# Patient Record
Sex: MALE | Race: WHITE | Employment: OTHER | URBAN - METROPOLITAN AREA
[De-identification: names, ages, dates, MRNs, and addresses within clinical notes are randomized per-mention and may not be internally consistent; named-entity substitution may affect disease eponyms.]

---

## 2017-08-01 PROBLEM — M48.061 LUMBAR SPINAL STENOSIS: Status: ACTIVE | Noted: 2017-08-01

## 2017-08-01 PROBLEM — I10 ESSENTIAL HYPERTENSION: Status: ACTIVE | Noted: 2017-08-01

## 2017-08-01 PROBLEM — E11.69 HYPERLIPIDEMIA ASSOCIATED WITH TYPE 2 DIABETES MELLITUS (HCC): Status: ACTIVE | Noted: 2017-08-01

## 2017-08-01 PROBLEM — E78.5 HYPERLIPIDEMIA ASSOCIATED WITH TYPE 2 DIABETES MELLITUS (HCC): Status: ACTIVE | Noted: 2017-08-01

## 2017-08-01 PROBLEM — E11.29 TYPE 2 DIABETES MELLITUS WITH KIDNEY COMPLICATION, WITHOUT LONG-TERM CURRENT USE OF INSULIN (HCC): Status: ACTIVE | Noted: 2017-08-01

## 2017-08-01 PROBLEM — N40.0 BENIGN PROSTATIC HYPERPLASIA WITHOUT LOWER URINARY TRACT SYMPTOMS: Status: ACTIVE | Noted: 2017-08-01

## 2017-08-01 PROBLEM — R01.1 PANSYSTOLIC MURMUR: Status: ACTIVE | Noted: 2017-08-01

## 2018-12-03 PROBLEM — E53.8 B12 DEFICIENCY: Status: ACTIVE | Noted: 2018-01-01

## 2019-01-01 ENCOUNTER — CARE COORDINATION (OUTPATIENT)
Dept: CARE COORDINATION | Age: 84
End: 2019-01-01

## 2019-01-01 ENCOUNTER — APPOINTMENT (OUTPATIENT)
Dept: GENERAL RADIOLOGY | Age: 84
DRG: 812 | End: 2019-01-01
Attending: SURGERY
Payer: MEDICARE

## 2019-01-01 ENCOUNTER — ANESTHESIA (OUTPATIENT)
Dept: ENDOSCOPY | Age: 84
DRG: 812 | End: 2019-01-01
Payer: MEDICARE

## 2019-01-01 ENCOUNTER — HOSPITAL ENCOUNTER (OUTPATIENT)
Dept: NUCLEAR MEDICINE | Age: 84
Discharge: HOME | End: 2019-03-23
Payer: MEDICARE

## 2019-01-01 ENCOUNTER — HOSPITAL ENCOUNTER (EMERGENCY)
Age: 84
Discharge: HOME | End: 2019-04-15
Attending: EMERGENCY MEDICINE
Payer: MEDICARE

## 2019-01-01 ENCOUNTER — APPOINTMENT (OUTPATIENT)
Dept: GENERAL RADIOLOGY | Age: 84
DRG: 558 | End: 2019-01-01
Payer: MEDICARE

## 2019-01-01 ENCOUNTER — HOSPITAL ENCOUNTER (INPATIENT)
Age: 84
LOS: 1 days | Discharge: OTHER INSTITUTION | DRG: 558 | End: 2019-04-19
Attending: EMERGENCY MEDICINE | Admitting: INTERNAL MEDICINE
Payer: MEDICARE

## 2019-01-01 ENCOUNTER — HOSPITAL ENCOUNTER (OUTPATIENT)
Age: 84
Discharge: HOME | End: 2019-06-06
Payer: MEDICARE

## 2019-01-01 ENCOUNTER — ANESTHESIA EVENT (OUTPATIENT)
Dept: ENDOSCOPY | Age: 84
DRG: 812 | End: 2019-01-01
Payer: MEDICARE

## 2019-01-01 ENCOUNTER — TELEPHONE (OUTPATIENT)
Dept: ADMINISTRATIVE | Age: 84
End: 2019-01-01

## 2019-01-01 ENCOUNTER — HOSPITAL ENCOUNTER (OUTPATIENT)
Age: 84
Discharge: HOME | End: 2019-04-12
Payer: MEDICARE

## 2019-01-01 ENCOUNTER — TELEPHONE (OUTPATIENT)
Dept: PHARMACY | Facility: CLINIC | Age: 84
End: 2019-01-01

## 2019-01-01 ENCOUNTER — OFFICE VISIT (OUTPATIENT)
Dept: CARDIOLOGY CLINIC | Age: 84
End: 2019-01-01
Payer: MEDICARE

## 2019-01-01 ENCOUNTER — APPOINTMENT (OUTPATIENT)
Dept: CT IMAGING | Age: 84
DRG: 558 | End: 2019-01-01
Payer: MEDICARE

## 2019-01-01 ENCOUNTER — HOSPITAL ENCOUNTER (INPATIENT)
Age: 84
LOS: 3 days | Discharge: HOME | DRG: 812 | End: 2019-04-02
Attending: SURGERY | Admitting: SURGERY
Payer: MEDICARE

## 2019-01-01 ENCOUNTER — APPOINTMENT (OUTPATIENT)
Dept: CT IMAGING | Age: 84
End: 2019-01-01
Payer: MEDICARE

## 2019-01-01 VITALS
HEART RATE: 60 BPM | RESPIRATION RATE: 60 BRPM | SYSTOLIC BLOOD PRESSURE: 122 MMHG | BODY MASS INDEX: 26.02 KG/M2 | DIASTOLIC BLOOD PRESSURE: 60 MMHG | HEIGHT: 67 IN | WEIGHT: 165.8 LBS

## 2019-01-01 VITALS
TEMPERATURE: 97.9 F | HEIGHT: 67 IN | OXYGEN SATURATION: 96 % | SYSTOLIC BLOOD PRESSURE: 121 MMHG | BODY MASS INDEX: 24.8 KG/M2 | HEART RATE: 86 BPM | RESPIRATION RATE: 17 BRPM | DIASTOLIC BLOOD PRESSURE: 56 MMHG | WEIGHT: 158 LBS

## 2019-01-01 VITALS
RESPIRATION RATE: 16 BRPM | SYSTOLIC BLOOD PRESSURE: 118 MMHG | HEART RATE: 96 BPM | HEIGHT: 67 IN | TEMPERATURE: 98.8 F | WEIGHT: 158 LBS | DIASTOLIC BLOOD PRESSURE: 54 MMHG | OXYGEN SATURATION: 98 % | BODY MASS INDEX: 24.8 KG/M2

## 2019-01-01 VITALS
HEART RATE: 60 BPM | RESPIRATION RATE: 15 BRPM | TEMPERATURE: 98.3 F | HEIGHT: 67 IN | BODY MASS INDEX: 24.96 KG/M2 | OXYGEN SATURATION: 98 % | DIASTOLIC BLOOD PRESSURE: 63 MMHG | SYSTOLIC BLOOD PRESSURE: 124 MMHG | WEIGHT: 159 LBS

## 2019-01-01 VITALS
RESPIRATION RATE: 48 BRPM | OXYGEN SATURATION: 97 % | DIASTOLIC BLOOD PRESSURE: 48 MMHG | SYSTOLIC BLOOD PRESSURE: 98 MMHG

## 2019-01-01 DIAGNOSIS — R53.83 FATIGUE, UNSPECIFIED TYPE: ICD-10-CM

## 2019-01-01 DIAGNOSIS — R11.0 NAUSEA: ICD-10-CM

## 2019-01-01 DIAGNOSIS — G89.29 CHRONIC MIDLINE LOW BACK PAIN WITH BILATERAL SCIATICA: ICD-10-CM

## 2019-01-01 DIAGNOSIS — M54.42 CHRONIC MIDLINE LOW BACK PAIN WITH BILATERAL SCIATICA: ICD-10-CM

## 2019-01-01 DIAGNOSIS — R06.02 SOBOE (SHORTNESS OF BREATH ON EXERTION): ICD-10-CM

## 2019-01-01 DIAGNOSIS — Z79.899 ENCOUNTER FOR MEDICATION REVIEW: Primary | ICD-10-CM

## 2019-01-01 DIAGNOSIS — M54.41 CHRONIC MIDLINE LOW BACK PAIN WITH BILATERAL SCIATICA: ICD-10-CM
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
## 2019-01-01 DIAGNOSIS — R12 HEART BURN: ICD-10-CM

## 2019-01-01 DIAGNOSIS — R63.0 POOR APPETITE: ICD-10-CM

## 2019-01-01 DIAGNOSIS — I10 ESSENTIAL HYPERTENSION: ICD-10-CM

## 2019-01-01 DIAGNOSIS — D63.8 ANEMIA OF CHRONIC DISEASE: ICD-10-CM

## 2019-01-01 DIAGNOSIS — E78.5 DYSLIPIDEMIA: ICD-10-CM

## 2019-01-01 DIAGNOSIS — N18.4 STAGE 4 CHRONIC KIDNEY DISEASE (HCC): ICD-10-CM

## 2019-01-01 DIAGNOSIS — N18.4 CKD (CHRONIC KIDNEY DISEASE) STAGE 4, GFR 15-29 ML/MIN (HCC): ICD-10-CM

## 2019-01-01 DIAGNOSIS — R11.2 NON-INTRACTABLE VOMITING WITH NAUSEA, UNSPECIFIED VOMITING TYPE: Primary | ICD-10-CM

## 2019-01-01 DIAGNOSIS — R01.1 HEART MURMUR: ICD-10-CM

## 2019-01-01 DIAGNOSIS — Z86.39 H/O NON ANEMIC VITAMIN B12 DEFICIENCY: ICD-10-CM

## 2019-01-01 DIAGNOSIS — N40.0 BENIGN PROSTATIC HYPERPLASIA WITHOUT LOWER URINARY TRACT SYMPTOMS: ICD-10-CM

## 2019-01-01 DIAGNOSIS — R06.02 SOBOE (SHORTNESS OF BREATH ON EXERTION): Primary | ICD-10-CM

## 2019-01-01 DIAGNOSIS — M48.061 SPINAL STENOSIS OF LUMBAR REGION, UNSPECIFIED WHETHER NEUROGENIC CLAUDICATION PRESENT: ICD-10-CM

## 2019-01-01 LAB
ALBUMIN SERPL-MCNC: 2.7 G/DL (ref 3.5–5.2)
ALBUMIN SERPL-MCNC: 2.8 G/DL (ref 3.5–5.2)
ALBUMIN SERPL-MCNC: 2.8 G/DL (ref 3.5–5.2)
ALBUMIN SERPL-MCNC: 2.9 G/DL (ref 3.5–5.2)
ALBUMIN SERPL-MCNC: 2.9 G/DL (ref 3.5–5.2)
ALBUMIN SERPL-MCNC: 3 G/DL (ref 3.5–5.2)
ALBUMIN SERPL-MCNC: 3.2 G/DL (ref 3.5–5.2)
ALBUMIN SERPL-MCNC: 3.3 G/DL (ref 3.5–5.2)
ALP BLD-CCNC: 49 U/L (ref 40–129)
ALP BLD-CCNC: 59 U/L (ref 40–129)
ALP BLD-CCNC: 61 U/L (ref 40–129)
ALP BLD-CCNC: 63 U/L (ref 40–129)
ALP BLD-CCNC: 63 U/L (ref 40–129)
ALP BLD-CCNC: 69 U/L (ref 40–129)
ALT SERPL-CCNC: 11 U/L (ref 0–40)
ALT SERPL-CCNC: 6 U/L (ref 0–40)
ALT SERPL-CCNC: 7 U/L (ref 0–40)
ANION GAP SERPL CALCULATED.3IONS-SCNC: 10 MMOL/L (ref 7–16)
ANION GAP SERPL CALCULATED.3IONS-SCNC: 11 MMOL/L (ref 7–16)
ANION GAP SERPL CALCULATED.3IONS-SCNC: 11 MMOL/L (ref 7–16)
ANION GAP SERPL CALCULATED.3IONS-SCNC: 13 MMOL/L (ref 7–16)
ANION GAP SERPL CALCULATED.3IONS-SCNC: 13 MMOL/L (ref 7–16)
ANION GAP SERPL CALCULATED.3IONS-SCNC: 14 MMOL/L (ref 7–16)
ANION GAP SERPL CALCULATED.3IONS-SCNC: 14 MMOL/L (ref 7–16)
ANION GAP SERPL CALCULATED.3IONS-SCNC: 16 MMOL/L (ref 7–16)
ANION GAP SERPL CALCULATED.3IONS-SCNC: 8 MMOL/L (ref 7–16)
AST SERPL-CCNC: 11 U/L (ref 0–39)
AST SERPL-CCNC: 12 U/L (ref 0–39)
AST SERPL-CCNC: 15 U/L (ref 0–39)
AST SERPL-CCNC: 18 U/L (ref 0–39)
AST SERPL-CCNC: 18 U/L (ref 0–39)
AST SERPL-CCNC: 31 U/L (ref 0–39)
BACTERIA: ABNORMAL /HPF
BACTERIA: ABNORMAL /HPF
BASOPHILS ABSOLUTE: 0.02 E9/L (ref 0–0.2)
BASOPHILS ABSOLUTE: 0.03 E9/L (ref 0–0.2)
BASOPHILS ABSOLUTE: 0.03 E9/L (ref 0–0.2)
BASOPHILS ABSOLUTE: 0.04 E9/L (ref 0–0.2)
BASOPHILS ABSOLUTE: 0.04 E9/L (ref 0–0.2)
BASOPHILS RELATIVE PERCENT: 0.2 % (ref 0–2)
BASOPHILS RELATIVE PERCENT: 0.2 % (ref 0–2)
BASOPHILS RELATIVE PERCENT: 0.3 % (ref 0–2)
BASOPHILS RELATIVE PERCENT: 0.4 % (ref 0–2)
BASOPHILS RELATIVE PERCENT: 0.5 % (ref 0–2)
BASOPHILS RELATIVE PERCENT: 0.6 % (ref 0–2)
BILIRUB SERPL-MCNC: 0.2 MG/DL (ref 0–1.2)
BILIRUB SERPL-MCNC: 0.2 MG/DL (ref 0–1.2)
BILIRUB SERPL-MCNC: 0.3 MG/DL (ref 0–1.2)
BILIRUB SERPL-MCNC: 0.3 MG/DL (ref 0–1.2)
BILIRUB SERPL-MCNC: <0.2 MG/DL (ref 0–1.2)
BILIRUBIN URINE: ABNORMAL
BILIRUBIN URINE: NEGATIVE
BLOOD, URINE: ABNORMAL
BLOOD, URINE: ABNORMAL
BUN BLDV-MCNC: 19 MG/DL (ref 8–23)
BUN BLDV-MCNC: 23 MG/DL (ref 8–23)
BUN BLDV-MCNC: 25 MG/DL (ref 8–23)
BUN BLDV-MCNC: 26 MG/DL (ref 8–23)
BUN BLDV-MCNC: 31 MG/DL (ref 8–23)
BUN BLDV-MCNC: 32 MG/DL (ref 8–23)
BUN BLDV-MCNC: 40 MG/DL (ref 8–23)
BUN BLDV-MCNC: 42 MG/DL (ref 8–23)
BUN BLDV-MCNC: 43 MG/DL (ref 8–23)
CALCIUM SERPL-MCNC: 7.5 MG/DL (ref 8.6–10.2)
CALCIUM SERPL-MCNC: 7.9 MG/DL (ref 8.6–10.2)
CALCIUM SERPL-MCNC: 7.9 MG/DL (ref 8.6–10.2)
CALCIUM SERPL-MCNC: 8.5 MG/DL (ref 8.6–10.2)
CALCIUM SERPL-MCNC: 8.6 MG/DL (ref 8.6–10.2)
CALCIUM SERPL-MCNC: 8.6 MG/DL (ref 8.6–10.2)
CALCIUM SERPL-MCNC: 8.7 MG/DL (ref 8.6–10.2)
CALCIUM SERPL-MCNC: 8.7 MG/DL (ref 8.6–10.2)
CALCIUM SERPL-MCNC: 9.1 MG/DL (ref 8.6–10.2)
CASTS: ABNORMAL /LPF
CASTS: ABNORMAL /LPF
CHLORIDE BLD-SCNC: 101 MMOL/L (ref 98–107)
CHLORIDE BLD-SCNC: 102 MMOL/L (ref 98–107)
CHLORIDE BLD-SCNC: 102 MMOL/L (ref 98–107)
CHLORIDE BLD-SCNC: 104 MMOL/L (ref 98–107)
CHLORIDE BLD-SCNC: 104 MMOL/L (ref 98–107)
CHLORIDE BLD-SCNC: 105 MMOL/L (ref 98–107)
CHLORIDE BLD-SCNC: 106 MMOL/L (ref 98–107)
CHLORIDE BLD-SCNC: 106 MMOL/L (ref 98–107)
CHLORIDE BLD-SCNC: 108 MMOL/L (ref 98–107)
CLARITY: CLEAR
CLARITY: CLEAR
CO2: 23 MMOL/L (ref 22–29)
CO2: 24 MMOL/L (ref 22–29)
CO2: 24 MMOL/L (ref 22–29)
CO2: 25 MMOL/L (ref 22–29)
CO2: 26 MMOL/L (ref 22–29)
CO2: 27 MMOL/L (ref 22–29)
COLOR: YELLOW
COLOR: YELLOW
CREAT SERPL-MCNC: 1.8 MG/DL (ref 0.7–1.2)
CREAT SERPL-MCNC: 2 MG/DL (ref 0.7–1.2)
CREAT SERPL-MCNC: 2.3 MG/DL (ref 0.7–1.2)
CREAT SERPL-MCNC: 2.4 MG/DL (ref 0.7–1.2)
CREAT SERPL-MCNC: 2.5 MG/DL (ref 0.7–1.2)
CREAT SERPL-MCNC: 2.7 MG/DL (ref 0.7–1.2)
CREAT SERPL-MCNC: 2.7 MG/DL (ref 0.7–1.2)
EKG ATRIAL RATE: 71 BPM
EKG ATRIAL RATE: 85 BPM
EKG P AXIS: 49 DEGREES
EKG P-R INTERVAL: 188 MS
EKG P-R INTERVAL: 316 MS
EKG Q-T INTERVAL: 450 MS
EKG Q-T INTERVAL: 482 MS
EKG QRS DURATION: 142 MS
EKG QRS DURATION: 144 MS
EKG QTC CALCULATION (BAZETT): 489 MS
EKG QTC CALCULATION (BAZETT): 538 MS
EKG R AXIS: -31 DEGREES
EKG R AXIS: -33 DEGREES
EKG T AXIS: 31 DEGREES
EKG T AXIS: 74 DEGREES
EKG VENTRICULAR RATE: 71 BPM
EKG VENTRICULAR RATE: 75 BPM
EOSINOPHILS ABSOLUTE: 0.16 E9/L (ref 0.05–0.5)
EOSINOPHILS ABSOLUTE: 0.16 E9/L (ref 0.05–0.5)
EOSINOPHILS ABSOLUTE: 0.19 E9/L (ref 0.05–0.5)
EOSINOPHILS ABSOLUTE: 0.41 E9/L (ref 0.05–0.5)
EOSINOPHILS ABSOLUTE: 0.57 E9/L (ref 0.05–0.5)
EOSINOPHILS ABSOLUTE: 0.64 E9/L (ref 0.05–0.5)
EOSINOPHILS ABSOLUTE: 0.65 E9/L (ref 0.05–0.5)
EOSINOPHILS ABSOLUTE: 0.68 E9/L (ref 0.05–0.5)
EOSINOPHILS RELATIVE PERCENT: 1.8 % (ref 0–6)
EOSINOPHILS RELATIVE PERCENT: 2.2 % (ref 0–6)
EOSINOPHILS RELATIVE PERCENT: 2.3 % (ref 0–6)
EOSINOPHILS RELATIVE PERCENT: 4.7 % (ref 0–6)
EOSINOPHILS RELATIVE PERCENT: 7 % (ref 0–6)
EOSINOPHILS RELATIVE PERCENT: 8.4 % (ref 0–6)
EOSINOPHILS RELATIVE PERCENT: 8.8 % (ref 0–6)
EOSINOPHILS RELATIVE PERCENT: 8.8 % (ref 0–6)
EPITHELIAL CELLS, UA: ABNORMAL /HPF
GFR AFRICAN AMERICAN: 27
GFR AFRICAN AMERICAN: 27
GFR AFRICAN AMERICAN: 29
GFR AFRICAN AMERICAN: 31
GFR AFRICAN AMERICAN: 32
GFR AFRICAN AMERICAN: 34
GFR AFRICAN AMERICAN: 38
GFR AFRICAN AMERICAN: 43
GFR NON-AFRICAN AMERICAN: 22 ML/MIN/1.73
GFR NON-AFRICAN AMERICAN: 22 ML/MIN/1.73
GFR NON-AFRICAN AMERICAN: 24 ML/MIN/1.73
GFR NON-AFRICAN AMERICAN: 25 ML/MIN/1.73
GFR NON-AFRICAN AMERICAN: 27 ML/MIN/1.73
GFR NON-AFRICAN AMERICAN: 28 ML/MIN/1.73
GFR NON-AFRICAN AMERICAN: 31 ML/MIN/1.73
GFR NON-AFRICAN AMERICAN: 35 ML/MIN/1.73
GLUCOSE BLD-MCNC: 100 MG/DL (ref 74–99)
GLUCOSE BLD-MCNC: 100 MG/DL (ref 74–99)
GLUCOSE BLD-MCNC: 102 MG/DL (ref 74–99)
GLUCOSE BLD-MCNC: 102 MG/DL (ref 74–99)
GLUCOSE BLD-MCNC: 111 MG/DL (ref 74–99)
GLUCOSE BLD-MCNC: 121 MG/DL (ref 74–99)
GLUCOSE BLD-MCNC: 74 MG/DL (ref 74–99)
GLUCOSE BLD-MCNC: 82 MG/DL (ref 74–99)
GLUCOSE BLD-MCNC: 83 MG/DL (ref 74–99)
GLUCOSE BLD-MCNC: 99 MG/DL (ref 74–99)
GLUCOSE URINE: NEGATIVE MG/DL
GLUCOSE URINE: NEGATIVE MG/DL
HBA1C MFR BLD: 5.9 % (ref 4–5.6)
HCT VFR BLD CALC: 21.6 % (ref 37–54)
HCT VFR BLD CALC: 22.6 % (ref 37–54)
HCT VFR BLD CALC: 22.9 % (ref 37–54)
HCT VFR BLD CALC: 22.9 % (ref 37–54)
HCT VFR BLD CALC: 26 % (ref 37–54)
HCT VFR BLD CALC: 26 % (ref 37–54)
HCT VFR BLD CALC: 26.5 % (ref 37–54)
HCT VFR BLD CALC: 27.1 % (ref 37–54)
HEMOGLOBIN: 7.2 G/DL (ref 12.5–16.5)
HEMOGLOBIN: 7.4 G/DL (ref 12.5–16.5)
HEMOGLOBIN: 7.5 G/DL (ref 12.5–16.5)
HEMOGLOBIN: 7.6 G/DL (ref 12.5–16.5)
HEMOGLOBIN: 8.3 G/DL (ref 12.5–16.5)
HEMOGLOBIN: 8.3 G/DL (ref 12.5–16.5)
HEMOGLOBIN: 8.5 G/DL (ref 12.5–16.5)
HEMOGLOBIN: 8.6 G/DL (ref 12.5–16.5)
IMMATURE GRANULOCYTES #: 0.02 E9/L
IMMATURE GRANULOCYTES #: 0.03 E9/L
IMMATURE GRANULOCYTES #: 0.03 E9/L
IMMATURE GRANULOCYTES #: 0.04 E9/L
IMMATURE GRANULOCYTES #: 0.05 E9/L
IMMATURE GRANULOCYTES #: 0.06 E9/L
IMMATURE GRANULOCYTES %: 0.3 % (ref 0–5)
IMMATURE GRANULOCYTES %: 0.4 % (ref 0–5)
IMMATURE GRANULOCYTES %: 0.4 % (ref 0–5)
IMMATURE GRANULOCYTES %: 0.5 % (ref 0–5)
IMMATURE GRANULOCYTES %: 0.7 % (ref 0–5)
INR BLD: 1.1
KETONES, URINE: 15 MG/DL
KETONES, URINE: 40 MG/DL
LACTIC ACID, SEPSIS: 1.3 MMOL/L (ref 0.5–1.9)
LEUKOCYTE ESTERASE, URINE: ABNORMAL
LEUKOCYTE ESTERASE, URINE: NEGATIVE
LIPASE: 77 U/L (ref 13–60)
LYMPHOCYTES ABSOLUTE: 1.74 E9/L (ref 1.5–4)
LYMPHOCYTES ABSOLUTE: 1.75 E9/L (ref 1.5–4)
LYMPHOCYTES ABSOLUTE: 1.82 E9/L (ref 1.5–4)
LYMPHOCYTES ABSOLUTE: 1.84 E9/L (ref 1.5–4)
LYMPHOCYTES ABSOLUTE: 2.02 E9/L (ref 1.5–4)
LYMPHOCYTES ABSOLUTE: 2.06 E9/L (ref 1.5–4)
LYMPHOCYTES ABSOLUTE: 2.13 E9/L (ref 1.5–4)
LYMPHOCYTES ABSOLUTE: 2.31 E9/L (ref 1.5–4)
LYMPHOCYTES RELATIVE PERCENT: 19 % (ref 20–42)
LYMPHOCYTES RELATIVE PERCENT: 20.7 % (ref 20–42)
LYMPHOCYTES RELATIVE PERCENT: 22.5 % (ref 20–42)
LYMPHOCYTES RELATIVE PERCENT: 24.5 % (ref 20–42)
LYMPHOCYTES RELATIVE PERCENT: 25.3 % (ref 20–42)
LYMPHOCYTES RELATIVE PERCENT: 26.4 % (ref 20–42)
LYMPHOCYTES RELATIVE PERCENT: 28 % (ref 20–42)
LYMPHOCYTES RELATIVE PERCENT: 29.7 % (ref 20–42)
MAGNESIUM: 2.1 MG/DL (ref 1.6–2.6)
MCH RBC QN AUTO: 31.2 PG (ref 26–35)
MCH RBC QN AUTO: 31.2 PG (ref 26–35)
MCH RBC QN AUTO: 31.3 PG (ref 26–35)
MCH RBC QN AUTO: 31.5 PG (ref 26–35)
MCH RBC QN AUTO: 31.8 PG (ref 26–35)
MCH RBC QN AUTO: 31.9 PG (ref 26–35)
MCH RBC QN AUTO: 31.9 PG (ref 26–35)
MCH RBC QN AUTO: 32.1 PG (ref 26–35)
MCHC RBC AUTO-ENTMCNC: 31.3 % (ref 32–34.5)
MCHC RBC AUTO-ENTMCNC: 31.7 % (ref 32–34.5)
MCHC RBC AUTO-ENTMCNC: 31.9 % (ref 32–34.5)
MCHC RBC AUTO-ENTMCNC: 32.7 % (ref 32–34.5)
MCHC RBC AUTO-ENTMCNC: 32.7 % (ref 32–34.5)
MCHC RBC AUTO-ENTMCNC: 32.8 % (ref 32–34.5)
MCHC RBC AUTO-ENTMCNC: 33.2 % (ref 32–34.5)
MCHC RBC AUTO-ENTMCNC: 33.3 % (ref 32–34.5)
MCV RBC AUTO: 100 FL (ref 80–99.9)
MCV RBC AUTO: 96.2 FL (ref 80–99.9)
MCV RBC AUTO: 96.3 FL (ref 80–99.9)
MCV RBC AUTO: 96.4 FL (ref 80–99.9)
MCV RBC AUTO: 97 FL (ref 80–99.9)
MCV RBC AUTO: 97.4 FL (ref 80–99.9)
MCV RBC AUTO: 97.7 FL (ref 80–99.9)
MCV RBC AUTO: 98.2 FL (ref 80–99.9)
METER GLUCOSE: 101 MG/DL (ref 74–99)
METER GLUCOSE: 105 MG/DL (ref 74–99)
METER GLUCOSE: 107 MG/DL (ref 74–99)
METER GLUCOSE: 107 MG/DL (ref 74–99)
METER GLUCOSE: 113 MG/DL (ref 74–99)
METER GLUCOSE: 120 MG/DL (ref 74–99)
METER GLUCOSE: 134 MG/DL (ref 74–99)
METER GLUCOSE: 137 MG/DL (ref 74–99)
METER GLUCOSE: 162 MG/DL (ref 74–99)
METER GLUCOSE: 72 MG/DL (ref 74–99)
METER GLUCOSE: 98 MG/DL (ref 74–99)
MONOCYTES ABSOLUTE: 0.79 E9/L (ref 0.1–0.95)
MONOCYTES ABSOLUTE: 0.8 E9/L (ref 0.1–0.95)
MONOCYTES ABSOLUTE: 0.8 E9/L (ref 0.1–0.95)
MONOCYTES ABSOLUTE: 0.81 E9/L (ref 0.1–0.95)
MONOCYTES ABSOLUTE: 0.82 E9/L (ref 0.1–0.95)
MONOCYTES ABSOLUTE: 0.82 E9/L (ref 0.1–0.95)
MONOCYTES ABSOLUTE: 0.91 E9/L (ref 0.1–0.95)
MONOCYTES ABSOLUTE: 0.93 E9/L (ref 0.1–0.95)
MONOCYTES RELATIVE PERCENT: 10.2 % (ref 2–12)
MONOCYTES RELATIVE PERCENT: 10.3 % (ref 2–12)
MONOCYTES RELATIVE PERCENT: 10.5 % (ref 2–12)
MONOCYTES RELATIVE PERCENT: 10.7 % (ref 2–12)
MONOCYTES RELATIVE PERCENT: 11 % (ref 2–12)
MONOCYTES RELATIVE PERCENT: 11.6 % (ref 2–12)
MONOCYTES RELATIVE PERCENT: 9.3 % (ref 2–12)
MONOCYTES RELATIVE PERCENT: 9.6 % (ref 2–12)
NEUTROPHILS ABSOLUTE: 3.79 E9/L (ref 1.8–7.3)
NEUTROPHILS ABSOLUTE: 3.93 E9/L (ref 1.8–7.3)
NEUTROPHILS ABSOLUTE: 4.1 E9/L (ref 1.8–7.3)
NEUTROPHILS ABSOLUTE: 4.14 E9/L (ref 1.8–7.3)
NEUTROPHILS ABSOLUTE: 4.92 E9/L (ref 1.8–7.3)
NEUTROPHILS ABSOLUTE: 5.38 E9/L (ref 1.8–7.3)
NEUTROPHILS ABSOLUTE: 5.67 E9/L (ref 1.8–7.3)
NEUTROPHILS ABSOLUTE: 6.24 E9/L (ref 1.8–7.3)
NEUTROPHILS RELATIVE PERCENT: 50.5 % (ref 43–80)
NEUTROPHILS RELATIVE PERCENT: 51.4 % (ref 43–80)
NEUTROPHILS RELATIVE PERCENT: 53.6 % (ref 43–80)
NEUTROPHILS RELATIVE PERCENT: 59.9 % (ref 43–80)
NEUTROPHILS RELATIVE PERCENT: 60 % (ref 43–80)
NEUTROPHILS RELATIVE PERCENT: 61.9 % (ref 43–80)
NEUTROPHILS RELATIVE PERCENT: 64.5 % (ref 43–80)
NEUTROPHILS RELATIVE PERCENT: 68.3 % (ref 43–80)
NITRITE, URINE: NEGATIVE
NITRITE, URINE: NEGATIVE
PDW BLD-RTO: 13.9 FL (ref 11.5–15)
PDW BLD-RTO: 14.1 FL (ref 11.5–15)
PDW BLD-RTO: 14.3 FL (ref 11.5–15)
PDW BLD-RTO: 14.3 FL (ref 11.5–15)
PDW BLD-RTO: 14.4 FL (ref 11.5–15)
PDW BLD-RTO: 14.4 FL (ref 11.5–15)
PDW BLD-RTO: 14.6 FL (ref 11.5–15)
PDW BLD-RTO: 17.4 FL (ref 11.5–15)
PERFORMED ON: ABNORMAL
PH UA: 5.5 (ref 5–9)
PH UA: 5.5 (ref 5–9)
PLATELET # BLD: 184 E9/L (ref 130–450)
PLATELET # BLD: 185 E9/L (ref 130–450)
PLATELET # BLD: 201 E9/L (ref 130–450)
PLATELET # BLD: 205 E9/L (ref 130–450)
PLATELET # BLD: 207 E9/L (ref 130–450)
PLATELET # BLD: 235 E9/L (ref 130–450)
PLATELET # BLD: 237 E9/L (ref 130–450)
PLATELET # BLD: 243 E9/L (ref 130–450)
PMV BLD AUTO: 10 FL (ref 7–12)
PMV BLD AUTO: 10.1 FL (ref 7–12)
PMV BLD AUTO: 10.2 FL (ref 7–12)
PMV BLD AUTO: 10.3 FL (ref 7–12)
PMV BLD AUTO: 11.8 FL (ref 7–12)
PMV BLD AUTO: 9.7 FL (ref 7–12)
PMV BLD AUTO: 9.8 FL (ref 7–12)
PMV BLD AUTO: 9.8 FL (ref 7–12)
POC CHLORIDE: 111 MMOL/L (ref 100–108)
POC CREATININE: 2.2 MG/DL (ref 0.7–1.2)
POC POTASSIUM: 4.1 MMOL/L (ref 3.5–5)
POC SODIUM: 142 MMOL/L (ref 132–146)
POTASSIUM REFLEX MAGNESIUM: 3.7 MMOL/L (ref 3.5–5)
POTASSIUM REFLEX MAGNESIUM: 4.2 MMOL/L (ref 3.5–5)
POTASSIUM REFLEX MAGNESIUM: 4.2 MMOL/L (ref 3.5–5)
POTASSIUM SERPL-SCNC: 2.9 MMOL/L (ref 3.5–5)
POTASSIUM SERPL-SCNC: 3.8 MMOL/L (ref 3.5–5)
POTASSIUM SERPL-SCNC: 4 MMOL/L (ref 3.5–5)
POTASSIUM SERPL-SCNC: 4.1 MMOL/L (ref 3.5–5)
POTASSIUM SERPL-SCNC: 4.2 MMOL/L (ref 3.5–5)
PROTEIN UA: ABNORMAL MG/DL
PROTEIN UA: NEGATIVE MG/DL
PROTHROMBIN TIME: 12.4 SEC (ref 9.3–12.4)
RBC # BLD: 2.24 E12/L (ref 3.8–5.8)
RBC # BLD: 2.33 E12/L (ref 3.8–5.8)
RBC # BLD: 2.35 E12/L (ref 3.8–5.8)
RBC # BLD: 2.38 E12/L (ref 3.8–5.8)
RBC # BLD: 2.65 E12/L (ref 3.8–5.8)
RBC # BLD: 2.66 E12/L (ref 3.8–5.8)
RBC # BLD: 2.7 E12/L (ref 3.8–5.8)
RBC # BLD: 2.76 E12/L (ref 3.8–5.8)
RBC UA: ABNORMAL /HPF (ref 0–2)
RBC UA: ABNORMAL /HPF (ref 0–2)
RENAL EPITHELIAL, UA: ABNORMAL /HPF
SODIUM BLD-SCNC: 138 MMOL/L (ref 132–146)
SODIUM BLD-SCNC: 140 MMOL/L (ref 132–146)
SODIUM BLD-SCNC: 141 MMOL/L (ref 132–146)
SODIUM BLD-SCNC: 142 MMOL/L (ref 132–146)
SODIUM BLD-SCNC: 143 MMOL/L (ref 132–146)
SPECIFIC GRAVITY UA: >=1.03 (ref 1–1.03)
SPECIFIC GRAVITY UA: >=1.03 (ref 1–1.03)
TOTAL CK: 367 U/L (ref 20–200)
TOTAL CK: 474 U/L (ref 20–200)
TOTAL PROTEIN: 5.5 G/DL (ref 6.4–8.3)
TOTAL PROTEIN: 5.6 G/DL (ref 6.4–8.3)
TOTAL PROTEIN: 5.9 G/DL (ref 6.4–8.3)
TOTAL PROTEIN: 6 G/DL (ref 6.4–8.3)
TOTAL PROTEIN: 6 G/DL (ref 6.4–8.3)
TOTAL PROTEIN: 6.3 G/DL (ref 6.4–8.3)
TOTAL PROTEIN: 6.6 G/DL (ref 6.4–8.3)
TOTAL PROTEIN: 6.8 G/DL (ref 6.4–8.3)
TROPONIN: 0.03 NG/ML (ref 0–0.03)
URINE CULTURE, ROUTINE: NORMAL
UROBILINOGEN, URINE: 0.2 E.U./DL
UROBILINOGEN, URINE: 0.2 E.U./DL
WBC # BLD: 6.9 E9/L (ref 4.5–11.5)
WBC # BLD: 7.4 E9/L (ref 4.5–11.5)
WBC # BLD: 7.7 E9/L (ref 4.5–11.5)
WBC # BLD: 7.8 E9/L (ref 4.5–11.5)
WBC # BLD: 8.2 E9/L (ref 4.5–11.5)
WBC # BLD: 8.7 E9/L (ref 4.5–11.5)
WBC # BLD: 8.8 E9/L (ref 4.5–11.5)
WBC # BLD: 9.1 E9/L (ref 4.5–11.5)
WBC UA: ABNORMAL /HPF (ref 0–5)
WBC UA: ABNORMAL /HPF (ref 0–5)

## 2019-01-01 PROCEDURE — 2580000003 HC RX 258: Performed by: EMERGENCY MEDICINE

## 2019-01-01 PROCEDURE — 87088 URINE BACTERIA CULTURE: CPT

## 2019-01-01 PROCEDURE — 74176 CT ABD & PELVIS W/O CONTRAST: CPT

## 2019-01-01 PROCEDURE — G0378 HOSPITAL OBSERVATION PER HR: HCPCS

## 2019-01-01 PROCEDURE — 72125 CT NECK SPINE W/O DYE: CPT

## 2019-01-01 PROCEDURE — 2500000003 HC RX 250 WO HCPCS: Performed by: STUDENT IN AN ORGANIZED HEALTH CARE EDUCATION/TRAINING PROGRAM

## 2019-01-01 PROCEDURE — 93005 ELECTROCARDIOGRAM TRACING: CPT

## 2019-01-01 PROCEDURE — 2709999900 HC NON-CHARGEABLE SUPPLY: Performed by: SURGERY

## 2019-01-01 PROCEDURE — 93000 ELECTROCARDIOGRAM COMPLETE: CPT | Performed by: INTERNAL MEDICINE

## 2019-01-01 PROCEDURE — A9540 TC99M MAA: HCPCS | Performed by: RADIOLOGY

## 2019-01-01 PROCEDURE — 83735 ASSAY OF MAGNESIUM: CPT

## 2019-01-01 PROCEDURE — 97530 THERAPEUTIC ACTIVITIES: CPT

## 2019-01-01 PROCEDURE — 71045 X-RAY EXAM CHEST 1 VIEW: CPT

## 2019-01-01 PROCEDURE — 3700000000 HC ANESTHESIA ATTENDED CARE: Performed by: SURGERY

## 2019-01-01 PROCEDURE — 80053 COMPREHEN METABOLIC PANEL: CPT

## 2019-01-01 PROCEDURE — 78582 LUNG VENTILAT&PERFUS IMAGING: CPT

## 2019-01-01 PROCEDURE — 99285 EMERGENCY DEPT VISIT HI MDM: CPT

## 2019-01-01 PROCEDURE — 82962 GLUCOSE BLOOD TEST: CPT

## 2019-01-01 PROCEDURE — 2580000003 HC RX 258: Performed by: INTERNAL MEDICINE

## 2019-01-01 PROCEDURE — 2500000003 HC RX 250 WO HCPCS

## 2019-01-01 PROCEDURE — 82947 ASSAY GLUCOSE BLOOD QUANT: CPT

## 2019-01-01 PROCEDURE — 6370000000 HC RX 637 (ALT 250 FOR IP): Performed by: INTERNAL MEDICINE

## 2019-01-01 PROCEDURE — 81001 URINALYSIS AUTO W/SCOPE: CPT

## 2019-01-01 PROCEDURE — 85025 COMPLETE CBC W/AUTO DIFF WBC: CPT

## 2019-01-01 PROCEDURE — 36415 COLL VENOUS BLD VENIPUNCTURE: CPT

## 2019-01-01 PROCEDURE — 0DJD8ZZ INSPECTION OF LOWER INTESTINAL TRACT, VIA NATURAL OR ARTIFICIAL OPENING ENDOSCOPIC: ICD-10-PCS | Performed by: STUDENT IN AN ORGANIZED HEALTH CARE EDUCATION/TRAINING PROGRAM

## 2019-01-01 PROCEDURE — 83036 HEMOGLOBIN GLYCOSYLATED A1C: CPT

## 2019-01-01 PROCEDURE — 6370000000 HC RX 637 (ALT 250 FOR IP): Performed by: STUDENT IN AN ORGANIZED HEALTH CARE EDUCATION/TRAINING PROGRAM

## 2019-01-01 PROCEDURE — 93005 ELECTROCARDIOGRAM TRACING: CPT | Performed by: EMERGENCY MEDICINE

## 2019-01-01 PROCEDURE — 1200000000 HC SEMI PRIVATE

## 2019-01-01 PROCEDURE — 7100000001 HC PACU RECOVERY - ADDTL 15 MIN: Performed by: SURGERY

## 2019-01-01 PROCEDURE — A9558 XE133 XENON 10MCI: HCPCS | Performed by: RADIOLOGY

## 2019-01-01 PROCEDURE — 82550 ASSAY OF CK (CPK): CPT

## 2019-01-01 PROCEDURE — 7100000000 HC PACU RECOVERY - FIRST 15 MIN: Performed by: SURGERY

## 2019-01-01 PROCEDURE — 80048 BASIC METABOLIC PNL TOTAL CA: CPT

## 2019-01-01 PROCEDURE — 3430000000 HC RX DIAGNOSTIC RADIOPHARMACEUTICAL: Performed by: RADIOLOGY

## 2019-01-01 PROCEDURE — 83605 ASSAY OF LACTIC ACID: CPT

## 2019-01-01 PROCEDURE — 96360 HYDRATION IV INFUSION INIT: CPT

## 2019-01-01 PROCEDURE — 82565 ASSAY OF CREATININE: CPT

## 2019-01-01 PROCEDURE — 0DB48ZX EXCISION OF ESOPHAGOGASTRIC JUNCTION, VIA NATURAL OR ARTIFICIAL OPENING ENDOSCOPIC, DIAGNOSTIC: ICD-10-PCS | Performed by: STUDENT IN AN ORGANIZED HEALTH CARE EDUCATION/TRAINING PROGRAM

## 2019-01-01 PROCEDURE — 99205 OFFICE O/P NEW HI 60 MIN: CPT | Performed by: INTERNAL MEDICINE

## 2019-01-01 PROCEDURE — 97535 SELF CARE MNGMENT TRAINING: CPT

## 2019-01-01 PROCEDURE — 96361 HYDRATE IV INFUSION ADD-ON: CPT

## 2019-01-01 PROCEDURE — 6360000002 HC RX W HCPCS: Performed by: EMERGENCY MEDICINE

## 2019-01-01 PROCEDURE — 97165 OT EVAL LOW COMPLEX 30 MIN: CPT

## 2019-01-01 PROCEDURE — A4641 RADIOPHARM DX AGENT NOC: HCPCS | Performed by: PHYSICIAN ASSISTANT

## 2019-01-01 PROCEDURE — 84295 ASSAY OF SERUM SODIUM: CPT

## 2019-01-01 PROCEDURE — 74270 X-RAY XM COLON 1CNTRST STD: CPT

## 2019-01-01 PROCEDURE — 97166 OT EVAL MOD COMPLEX 45 MIN: CPT

## 2019-01-01 PROCEDURE — 3700000001 HC ADD 15 MINUTES (ANESTHESIA): Performed by: SURGERY

## 2019-01-01 PROCEDURE — 1111F DSCHRG MED/CURRENT MED MERGE: CPT

## 2019-01-01 PROCEDURE — 82435 ASSAY OF BLOOD CHLORIDE: CPT

## 2019-01-01 PROCEDURE — 84132 ASSAY OF SERUM POTASSIUM: CPT

## 2019-01-01 PROCEDURE — 97161 PT EVAL LOW COMPLEX 20 MIN: CPT

## 2019-01-01 PROCEDURE — 99284 EMERGENCY DEPT VISIT MOD MDM: CPT

## 2019-01-01 PROCEDURE — 3609009500 HC COLONOSCOPY DIAGNOSTIC OR SCREENING: Performed by: SURGERY

## 2019-01-01 PROCEDURE — 6360000004 HC RX CONTRAST MEDICATION: Performed by: PHYSICIAN ASSISTANT

## 2019-01-01 PROCEDURE — 83690 ASSAY OF LIPASE: CPT

## 2019-01-01 PROCEDURE — 3609012400 HC EGD TRANSORAL BIOPSY SINGLE/MULTIPLE: Performed by: SURGERY

## 2019-01-01 PROCEDURE — 88342 IMHCHEM/IMCYTCHM 1ST ANTB: CPT

## 2019-01-01 PROCEDURE — 0DB58ZX EXCISION OF ESOPHAGUS, VIA NATURAL OR ARTIFICIAL OPENING ENDOSCOPIC, DIAGNOSTIC: ICD-10-PCS | Performed by: STUDENT IN AN ORGANIZED HEALTH CARE EDUCATION/TRAINING PROGRAM

## 2019-01-01 PROCEDURE — 2060000000 HC ICU INTERMEDIATE R&B

## 2019-01-01 PROCEDURE — 6360000002 HC RX W HCPCS

## 2019-01-01 PROCEDURE — 84484 ASSAY OF TROPONIN QUANT: CPT

## 2019-01-01 PROCEDURE — 70450 CT HEAD/BRAIN W/O DYE: CPT

## 2019-01-01 PROCEDURE — 85610 PROTHROMBIN TIME: CPT

## 2019-01-01 PROCEDURE — 2580000003 HC RX 258

## 2019-01-01 PROCEDURE — 96374 THER/PROPH/DIAG INJ IV PUSH: CPT

## 2019-01-01 PROCEDURE — 88305 TISSUE EXAM BY PATHOLOGIST: CPT

## 2019-01-01 RX ORDER — TAMSULOSIN HYDROCHLORIDE 0.4 MG/1
0.4 CAPSULE ORAL DAILY
Status: DISCONTINUED | OUTPATIENT
Start: 2019-01-01 | End: 2019-01-01 | Stop reason: HOSPADM

## 2019-01-01 RX ORDER — SODIUM CHLORIDE 9 MG/ML
INJECTION, SOLUTION INTRAVENOUS CONTINUOUS
Status: DISCONTINUED | OUTPATIENT
Start: 2019-01-01 | End: 2019-01-01 | Stop reason: HOSPADM

## 2019-01-01 RX ORDER — SIMVASTATIN 10 MG
10 TABLET ORAL NIGHTLY
Status: DISCONTINUED | OUTPATIENT
Start: 2019-01-01 | End: 2019-01-01 | Stop reason: HOSPADM

## 2019-01-01 RX ORDER — ONDANSETRON 4 MG/1
8 TABLET, ORALLY DISINTEGRATING ORAL EVERY 8 HOURS PRN
Status: DISCONTINUED | OUTPATIENT
Start: 2019-01-01 | End: 2019-01-01 | Stop reason: HOSPADM

## 2019-01-01 RX ORDER — SODIUM CHLORIDE 0.9 % (FLUSH) 0.9 %
10 SYRINGE (ML) INJECTION PRN
Status: DISCONTINUED | OUTPATIENT
Start: 2019-01-01 | End: 2019-01-01 | Stop reason: HOSPADM

## 2019-01-01 RX ORDER — ONDANSETRON 2 MG/ML
4 INJECTION INTRAMUSCULAR; INTRAVENOUS EVERY 6 HOURS PRN
Status: DISCONTINUED | OUTPATIENT
Start: 2019-01-01 | End: 2019-01-01 | Stop reason: HOSPADM

## 2019-01-01 RX ORDER — ONDANSETRON 2 MG/ML
4 INJECTION INTRAMUSCULAR; INTRAVENOUS ONCE
Status: COMPLETED | OUTPATIENT
Start: 2019-01-01 | End: 2019-01-01

## 2019-01-01 RX ORDER — AMLODIPINE BESYLATE 2.5 MG/1
2.5 TABLET ORAL DAILY
Status: DISCONTINUED | OUTPATIENT
Start: 2019-01-01 | End: 2019-01-01 | Stop reason: HOSPADM

## 2019-01-01 RX ORDER — BENZONATATE 100 MG/1
100 CAPSULE ORAL 3 TIMES DAILY PRN
Status: DISCONTINUED | OUTPATIENT
Start: 2019-01-01 | End: 2019-01-01 | Stop reason: HOSPADM

## 2019-01-01 RX ORDER — PANTOPRAZOLE SODIUM 40 MG/1
40 TABLET, DELAYED RELEASE ORAL
Status: DISCONTINUED | OUTPATIENT
Start: 2019-01-01 | End: 2019-01-01 | Stop reason: HOSPADM

## 2019-01-01 RX ORDER — EPHEDRINE SULFATE/0.9% NACL/PF 50 MG/5 ML
SYRINGE (ML) INTRAVENOUS PRN
Status: DISCONTINUED | OUTPATIENT
Start: 2019-01-01 | End: 2019-01-01 | Stop reason: SDUPTHER

## 2019-01-01 RX ORDER — MULTIVITAMIN WITH FOLIC ACID 400 MCG
1 TABLET ORAL DAILY
Status: DISCONTINUED | OUTPATIENT
Start: 2019-01-01 | End: 2019-01-01 | Stop reason: HOSPADM

## 2019-01-01 RX ORDER — CLARITHROMYCIN 500 MG/1
500 TABLET, COATED ORAL 2 TIMES DAILY
Qty: 8 TABLET | Refills: 0
Start: 2019-01-01 | End: 2019-01-01

## 2019-01-01 RX ORDER — NICOTINE POLACRILEX 4 MG
15 LOZENGE BUCCAL PRN
Status: DISCONTINUED | OUTPATIENT
Start: 2019-01-01 | End: 2019-01-01 | Stop reason: HOSPADM

## 2019-01-01 RX ORDER — LOSARTAN POTASSIUM 50 MG/1
50 TABLET ORAL DAILY
Status: DISCONTINUED | OUTPATIENT
Start: 2019-01-01 | End: 2019-01-01 | Stop reason: HOSPADM

## 2019-01-01 RX ORDER — HYDROCHLOROTHIAZIDE 12.5 MG/1
12.5 TABLET ORAL DAILY
Status: DISCONTINUED | OUTPATIENT
Start: 2019-01-01 | End: 2019-01-01 | Stop reason: HOSPADM

## 2019-01-01 RX ORDER — LOSARTAN POTASSIUM AND HYDROCHLOROTHIAZIDE 12.5; 5 MG/1; MG/1
1 TABLET ORAL DAILY
Status: DISCONTINUED | OUTPATIENT
Start: 2019-01-01 | End: 2019-01-01 | Stop reason: CLARIF

## 2019-01-01 RX ORDER — XENON XE-133 10 MCI/1
17 GAS RESPIRATORY (INHALATION)
Status: COMPLETED | OUTPATIENT
Start: 2019-01-01 | End: 2019-01-01

## 2019-01-01 RX ORDER — 0.9 % SODIUM CHLORIDE 0.9 %
500 INTRAVENOUS SOLUTION INTRAVENOUS ONCE
Status: COMPLETED | OUTPATIENT
Start: 2019-01-01 | End: 2019-01-01

## 2019-01-01 RX ORDER — CHLORAL HYDRATE 500 MG
1 CAPSULE ORAL DAILY
COMMUNITY
End: 2019-01-01

## 2019-01-01 RX ORDER — CLARITHROMYCIN 250 MG/1
500 TABLET, FILM COATED ORAL 2 TIMES DAILY
Status: DISCONTINUED | OUTPATIENT
Start: 2019-01-01 | End: 2019-01-01 | Stop reason: HOSPADM

## 2019-01-01 RX ORDER — FERROUS SULFATE 325(65) MG
325 TABLET ORAL
Status: DISCONTINUED | OUTPATIENT
Start: 2019-01-01 | End: 2019-01-01 | Stop reason: HOSPADM

## 2019-01-01 RX ORDER — CHLORAL HYDRATE 500 MG
1 CAPSULE ORAL DAILY
Status: DISCONTINUED | OUTPATIENT
Start: 2019-01-01 | End: 2019-01-01 | Stop reason: CLARIF

## 2019-01-01 RX ORDER — ONDANSETRON 8 MG/1
8 TABLET, ORALLY DISINTEGRATING ORAL EVERY 8 HOURS PRN
Qty: 12 TABLET | Refills: 1 | Status: SHIPPED | OUTPATIENT
Start: 2019-01-01 | End: 2019-01-01

## 2019-01-01 RX ORDER — SODIUM CHLORIDE 9 MG/ML
INJECTION, SOLUTION INTRAVENOUS CONTINUOUS PRN
Status: DISCONTINUED | OUTPATIENT
Start: 2019-01-01 | End: 2019-01-01 | Stop reason: SDUPTHER

## 2019-01-01 RX ORDER — PROPOFOL 10 MG/ML
INJECTION, EMULSION INTRAVENOUS PRN
Status: DISCONTINUED | OUTPATIENT
Start: 2019-01-01 | End: 2019-01-01 | Stop reason: SDUPTHER

## 2019-01-01 RX ORDER — DEXTROSE MONOHYDRATE 25 G/50ML
12.5 INJECTION, SOLUTION INTRAVENOUS PRN
Status: DISCONTINUED | OUTPATIENT
Start: 2019-01-01 | End: 2019-01-01 | Stop reason: HOSPADM

## 2019-01-01 RX ORDER — MULTIVITAMIN WITH FOLIC ACID 400 MCG
TABLET ORAL DAILY
Status: DISCONTINUED | OUTPATIENT
Start: 2019-01-01 | End: 2019-01-01 | Stop reason: SDUPTHER

## 2019-01-01 RX ORDER — DEXTROSE, SODIUM CHLORIDE, AND POTASSIUM CHLORIDE 5; .45; .15 G/100ML; G/100ML; G/100ML
INJECTION INTRAVENOUS CONTINUOUS
Status: DISCONTINUED | OUTPATIENT
Start: 2019-01-01 | End: 2019-01-01

## 2019-01-01 RX ORDER — DEXTROSE MONOHYDRATE 50 MG/ML
100 INJECTION, SOLUTION INTRAVENOUS PRN
Status: DISCONTINUED | OUTPATIENT
Start: 2019-01-01 | End: 2019-01-01 | Stop reason: HOSPADM

## 2019-01-01 RX ORDER — 0.9 % SODIUM CHLORIDE 0.9 %
1000 INTRAVENOUS SOLUTION INTRAVENOUS ONCE
Status: COMPLETED | OUTPATIENT
Start: 2019-01-01 | End: 2019-01-01

## 2019-01-01 RX ORDER — SODIUM CHLORIDE 0.9 % (FLUSH) 0.9 %
10 SYRINGE (ML) INJECTION EVERY 12 HOURS SCHEDULED
Status: DISCONTINUED | OUTPATIENT
Start: 2019-01-01 | End: 2019-01-01 | Stop reason: HOSPADM

## 2019-01-01 RX ORDER — PANTOPRAZOLE SODIUM 40 MG/1
40 TABLET, DELAYED RELEASE ORAL DAILY
Status: DISCONTINUED | OUTPATIENT
Start: 2019-01-01 | End: 2019-01-01 | Stop reason: HOSPADM

## 2019-01-01 RX ORDER — PROPOFOL 10 MG/ML
INJECTION, EMULSION INTRAVENOUS PRN
Status: DISCONTINUED | OUTPATIENT
Start: 2019-01-01 | End: 2019-01-01

## 2019-01-01 RX ADMIN — Medication 10 ML: at 12:34

## 2019-01-01 RX ADMIN — SODIUM CHLORIDE: 9 INJECTION, SOLUTION INTRAVENOUS at 00:11

## 2019-01-01 RX ADMIN — PROPOFOL 10 MG: 10 INJECTION, EMULSION INTRAVENOUS at 15:46

## 2019-01-01 RX ADMIN — TAMSULOSIN HYDROCHLORIDE 0.4 MG: 0.4 CAPSULE ORAL at 22:09

## 2019-01-01 RX ADMIN — LINAGLIPTIN 5 MG: 5 TABLET, FILM COATED ORAL at 10:44

## 2019-01-01 RX ADMIN — LINAGLIPTIN 5 MG: 5 TABLET, FILM COATED ORAL at 19:10

## 2019-01-01 RX ADMIN — HYDROCHLOROTHIAZIDE 12.5 MG: 12.5 TABLET ORAL at 10:44

## 2019-01-01 RX ADMIN — CLARITHROMYCIN 500 MG: 250 TABLET ORAL at 21:47

## 2019-01-01 RX ADMIN — LOSARTAN POTASSIUM 50 MG: 50 TABLET, FILM COATED ORAL at 09:29

## 2019-01-01 RX ADMIN — HYDROCHLOROTHIAZIDE 12.5 MG: 12.5 TABLET ORAL at 19:10

## 2019-01-01 RX ADMIN — SIMVASTATIN 10 MG: 10 TABLET, FILM COATED ORAL at 22:09

## 2019-01-01 RX ADMIN — TAMSULOSIN HYDROCHLORIDE 0.4 MG: 0.4 CAPSULE ORAL at 21:47

## 2019-01-01 RX ADMIN — HYDROCHLOROTHIAZIDE 12.5 MG: 12.5 TABLET ORAL at 09:29

## 2019-01-01 RX ADMIN — MULTIVITAMIN TABLET 1 TABLET: TABLET at 11:11

## 2019-01-01 RX ADMIN — PROPOFOL 10 MG: 10 INJECTION, EMULSION INTRAVENOUS at 15:45

## 2019-01-01 RX ADMIN — XENON XE-133 17 MILLICURIE: 10 GAS RESPIRATORY (INHALATION) at 11:25

## 2019-01-01 RX ADMIN — LINAGLIPTIN 5 MG: 5 TABLET, FILM COATED ORAL at 09:29

## 2019-01-01 RX ADMIN — Medication 8 MILLICURIE: at 11:31

## 2019-01-01 RX ADMIN — LINAGLIPTIN 5 MG: 5 TABLET, FILM COATED ORAL at 11:11

## 2019-01-01 RX ADMIN — SIMVASTATIN 10 MG: 10 TABLET, FILM COATED ORAL at 20:54

## 2019-01-01 RX ADMIN — POLYETHYLENE GLYCOL 3350, SODIUM SULFATE ANHYDROUS, SODIUM BICARBONATE, SODIUM CHLORIDE, POTASSIUM CHLORIDE 4000 ML: 236; 22.74; 6.74; 5.86; 2.97 POWDER, FOR SOLUTION ORAL at 16:27

## 2019-01-01 RX ADMIN — PANTOPRAZOLE SODIUM 40 MG: 40 TABLET, DELAYED RELEASE ORAL at 11:12

## 2019-01-01 RX ADMIN — PROPOFOL 30 MG: 10 INJECTION, EMULSION INTRAVENOUS at 15:53

## 2019-01-01 RX ADMIN — BARIUM SULFATE 1000 ML: 1.05 SUSPENSION ORAL; RECTAL at 09:45

## 2019-01-01 RX ADMIN — CLARITHROMYCIN 500 MG: 250 TABLET ORAL at 11:12

## 2019-01-01 RX ADMIN — TAMSULOSIN HYDROCHLORIDE 0.4 MG: 0.4 CAPSULE ORAL at 11:12

## 2019-01-01 RX ADMIN — AMLODIPINE BESYLATE 2.5 MG: 2.5 TABLET ORAL at 11:11

## 2019-01-01 RX ADMIN — DEXTROSE, SODIUM CHLORIDE, AND POTASSIUM CHLORIDE: 5; .45; .15 INJECTION INTRAVENOUS at 10:04

## 2019-01-01 RX ADMIN — Medication 10 ML: at 12:51

## 2019-01-01 RX ADMIN — SODIUM CHLORIDE 500 ML: 9 INJECTION, SOLUTION INTRAVENOUS at 16:08

## 2019-01-01 RX ADMIN — LOSARTAN POTASSIUM 50 MG: 50 TABLET, FILM COATED ORAL at 21:18

## 2019-01-01 RX ADMIN — SODIUM CHLORIDE: 9 INJECTION, SOLUTION INTRAVENOUS at 15:41

## 2019-01-01 RX ADMIN — TAMSULOSIN HYDROCHLORIDE 0.4 MG: 0.4 CAPSULE ORAL at 22:06

## 2019-01-01 RX ADMIN — TAMSULOSIN HYDROCHLORIDE 0.4 MG: 0.4 CAPSULE ORAL at 19:37

## 2019-01-01 RX ADMIN — SODIUM CHLORIDE: 9 INJECTION, SOLUTION INTRAVENOUS at 11:26

## 2019-01-01 RX ADMIN — HYDROCHLOROTHIAZIDE 12.5 MG: 12.5 TABLET ORAL at 10:04

## 2019-01-01 RX ADMIN — PROPOFOL 20 MG: 10 INJECTION, EMULSION INTRAVENOUS at 15:56

## 2019-01-01 RX ADMIN — PROPOFOL 30 MG: 10 INJECTION, EMULSION INTRAVENOUS at 15:50

## 2019-01-01 RX ADMIN — ONDANSETRON 4 MG: 2 INJECTION INTRAMUSCULAR; INTRAVENOUS at 10:30

## 2019-01-01 RX ADMIN — LOSARTAN POTASSIUM 50 MG: 50 TABLET, FILM COATED ORAL at 10:43

## 2019-01-01 RX ADMIN — BENZONATATE 100 MG: 100 CAPSULE ORAL at 10:44

## 2019-01-01 RX ADMIN — MULTIVITAMIN TABLET 1 TABLET: TABLET at 21:47

## 2019-01-01 RX ADMIN — PANTOPRAZOLE SODIUM 40 MG: 40 TABLET, DELAYED RELEASE ORAL at 21:47

## 2019-01-01 RX ADMIN — DEXTROSE, SODIUM CHLORIDE, AND POTASSIUM CHLORIDE: 5; .45; .15 INJECTION INTRAVENOUS at 12:51

## 2019-01-01 RX ADMIN — SODIUM CHLORIDE 500 ML: 9 INJECTION, SOLUTION INTRAVENOUS at 10:30

## 2019-01-01 RX ADMIN — ONDANSETRON 8 MG: 4 TABLET, ORALLY DISINTEGRATING ORAL at 08:50

## 2019-01-01 RX ADMIN — PROPOFOL 20 MG: 10 INJECTION, EMULSION INTRAVENOUS at 15:47

## 2019-01-01 RX ADMIN — SODIUM CHLORIDE 1000 ML: 9 INJECTION, SOLUTION INTRAVENOUS at 21:47

## 2019-01-01 RX ADMIN — FERROUS SULFATE TAB 325 MG (65 MG ELEMENTAL FE) 325 MG: 325 (65 FE) TAB at 11:12

## 2019-01-01 RX ADMIN — PROPOFOL 20 MG: 10 INJECTION, EMULSION INTRAVENOUS at 15:43

## 2019-01-01 RX ADMIN — SIMVASTATIN 10 MG: 10 TABLET, FILM COATED ORAL at 22:06

## 2019-01-01 RX ADMIN — AMLODIPINE BESYLATE 2.5 MG: 2.5 TABLET ORAL at 21:47

## 2019-01-01 RX ADMIN — Medication 5 MG: at 15:59

## 2019-01-01 RX ADMIN — PROPOFOL 10 MG: 10 INJECTION, EMULSION INTRAVENOUS at 15:48

## 2019-01-01 RX ADMIN — PANTOPRAZOLE SODIUM 40 MG: 40 TABLET, DELAYED RELEASE ORAL at 06:41

## 2019-01-01 ASSESSMENT — ENCOUNTER SYMPTOMS
DIARRHEA: 0
NAUSEA: 0
SHORTNESS OF BREATH: 0
NAUSEA: 1
COLOR CHANGE: 0
ABDOMINAL PAIN: 0
CONSTIPATION: 0
EYE PAIN: 0
BACK PAIN: 0
SHORTNESS OF BREATH: 0
SORE THROAT: 0
EYE DISCHARGE: 0
COUGH: 0
SINUS PRESSURE: 0
WHEEZING: 0
DIARRHEA: 0
WHEEZING: 0
EYE REDNESS: 0
VOMITING: 0
VOMITING: 1
COUGH: 0
ABDOMINAL PAIN: 0

## 2019-01-01 ASSESSMENT — PAIN DESCRIPTION - FREQUENCY: FREQUENCY: INTERMITTENT

## 2019-01-01 ASSESSMENT — PAIN SCALES - GENERAL
PAINLEVEL_OUTOF10: 0
PAINLEVEL_OUTOF10: 1
PAINLEVEL_OUTOF10: 0
PAINLEVEL_OUTOF10: 0

## 2019-01-01 ASSESSMENT — PAIN DESCRIPTION - ONSET: ONSET: ON-GOING

## 2019-01-01 ASSESSMENT — PAIN DESCRIPTION - LOCATION: LOCATION: THROAT

## 2019-01-01 ASSESSMENT — PAIN DESCRIPTION - PROGRESSION: CLINICAL_PROGRESSION: NOT CHANGED

## 2019-01-01 ASSESSMENT — PAIN DESCRIPTION - PAIN TYPE: TYPE: ACUTE PAIN

## 2019-01-01 ASSESSMENT — PAIN - FUNCTIONAL ASSESSMENT: PAIN_FUNCTIONAL_ASSESSMENT: ACTIVITIES ARE NOT PREVENTED

## 2019-01-01 ASSESSMENT — PAIN DESCRIPTION - DESCRIPTORS: DESCRIPTORS: SORE;DISCOMFORT;TENDER

## 2019-03-21 PROBLEM — N18.4 CKD (CHRONIC KIDNEY DISEASE) STAGE 4, GFR 15-29 ML/MIN (HCC): Status: ACTIVE | Noted: 2019-01-01

## 2019-03-30 PROBLEM — E11.69 HYPERLIPIDEMIA ASSOCIATED WITH TYPE 2 DIABETES MELLITUS (HCC): Status: RESOLVED | Noted: 2017-08-01 | Resolved: 2019-01-01

## 2019-03-30 PROBLEM — R10.9 ABDOMINAL PAIN: Status: ACTIVE | Noted: 2019-01-01

## 2019-03-30 PROBLEM — R01.1 PANSYSTOLIC MURMUR: Status: RESOLVED | Noted: 2017-08-01 | Resolved: 2019-01-01

## 2019-03-30 PROBLEM — E78.5 HYPERLIPIDEMIA LDL GOAL <100: Chronic | Status: ACTIVE | Noted: 2019-01-01

## 2019-03-30 PROBLEM — E11.29 TYPE 2 DIABETES MELLITUS WITH KIDNEY COMPLICATION, WITHOUT LONG-TERM CURRENT USE OF INSULIN (HCC): Status: RESOLVED | Noted: 2017-08-01 | Resolved: 2019-01-01

## 2019-03-30 PROBLEM — E78.5 HYPERLIPIDEMIA ASSOCIATED WITH TYPE 2 DIABETES MELLITUS (HCC): Status: RESOLVED | Noted: 2017-08-01 | Resolved: 2019-01-01

## 2019-03-30 PROBLEM — M48.061 LUMBAR SPINAL STENOSIS: Status: RESOLVED | Noted: 2017-08-01 | Resolved: 2019-01-01

## 2019-03-30 NOTE — PROGRESS NOTES
# This medication is non-formulary and will not be dispensed by the pharmacy at this time. Please have patient bring in home supply of medication and deliver it to pharmacy for identification and barcode. If patient has not supplied medication by 1400 on 03/31/19, please notify pharmacy.  #   [facility] 3/30/2019  2:34 PM

## 2019-03-30 NOTE — CONSULTS
GENERAL SURGERY  CONSULT NOTE  3/30/2019    Physician Consulted: Dr. Cecy Mukherjee  Reason for Consult: GI bleed  Referring Physician: Dr. Florinda Rodriguez    Cc: weight loss, bloating, anemia    HPI  [employer]. Murali Farmer is a 80 y.o. male with history of CKD and diabetes who presents for evaluation of poor PO intake and anemia. Has had intermittent nausea, poor appetite for several weeks. Reports 10lb weight loss in the past month. Recommended EGD in the past but had not had this done; did have some improvement with PPI. His stools have been dark with iron supplements but heme negative in the office. PSH appendectomy, hemorrhoidectomy. Last colonoscopy 30 or 40 years ago. He does not think he ever had an EGD before    Past Medical History:   Diagnosis Date    Diabetes mellitus ([facility] [address]     Hypertension      Past Surgical History:   Procedure Laterality Date    APPENDECTOMY      HEMORRHOID SURGERY      banding     Medications Prior to Admission:    Prior to Admission medications    Medication Sig Start Date End Date Taking?  Authorizing Provider   Omega-3 Fatty Acids (OMEGA-3 FISH OIL PO) Take by mouth daily   Yes Historical Provider, MD   pantoprazole (PROTONIX) 40 MG tablet Take 1 tablet by mouth every morning (before breakfast) 3/13/19  Yes Amari Almonte MD   losartan-hydrochlorothiazide (HYZAAR) 50-12.5 MG per tablet Take 1 tablet by mouth daily 11/13/18  Yes Amari Almonte MD   metFORMIN (GLUCOPHAGE) 850 MG tablet Take 1 tablet by mouth daily (with breakfast) 4/16/18  Yes Amari Almonte MD   Mirabegron ER (MYRBETRIQ) 50 MG TB24 Take 50 mg by mouth daily 4/16/18  Yes Amari Almonte MD   simvastatin (ZOCOR) 10 MG tablet Take 1 tablet by mouth nightly 3/6/18  Yes Amari Almonte MD   ferrous sulfate 325 (65 Fe) MG tablet Take 325 mg by mouth daily (with breakfast)   Yes Historical Provider, MD   Pediatric Multivitamins-Fl (MULTI VIT/FL PO) Take by mouth   Yes Historical Provider, MD Talavera Meals 50 MD

## 2019-03-30 NOTE — PROGRESS NOTES
Therapeutic interchange for hyzaar 50/12.5mg.   [facility], [address] [place] [address] 3/30/2019  2:26 PM

## 2019-03-30 NOTE — PROGRESS NOTES
Message sent to Dr. Mary Mohr. Resident to see patient. Dr. Mary Mohr stated that due to HBG and hydration status, patient needs in hospital work up.

## 2019-03-30 NOTE — PROGRESS NOTES
Message sent per Dr. Nela Culver direction for resident to see patient. Message sent to Dr. Olman Abdullahi. Will see patient.

## 2019-03-30 NOTE — PROGRESS NOTES
Message to Dr. Teddi Councilman for admitting orders. Notified of med rec completed. Awaiting further orders.

## 2019-03-30 NOTE — PROGRESS NOTES
Attempted to contact Dr. Nico Nunez through perfect serve which is stating to contact Dr. Felipe Vasquez. This nurse contacted Nico Nunez answering service to inquire as to this, answering service stated to go through perfect serve. Message left with Dr. Felipe Vasquez regarding the direct admit status, explanation of contacting him and the family and patient's inquiry as to the admission. Awaiting response.

## 2019-03-31 PROBLEM — R63.0 ANOREXIA: Status: ACTIVE | Noted: 2019-01-01

## 2019-03-31 NOTE — PROGRESS NOTES
OCCUPATIONAL THERAPY INITIAL EVALUATION      Date:3/31/2019  Patient Name: Home Murrell  MRN: [id number]  : 1924  Room: [address] [place], [place] [address]A      Evaluating OT: Dorina Ochoa OTR/L #[id number]    AM-PAC Daily Activity Raw Score:     Recommended Adaptive Equipment: AD,  To be further assessed      Diagnosis: abdominal pain       Pertinent Medical History:   Past Medical History:   Diagnosis Date    Diabetes mellitus ([facility] [address]     Hypertension       Precautions:  Falls,      Home Living: Pt lives alone in a 1st floor apt with 2 step(s) to enter and 2 rail(s); bed/bath on 1st   Bathroom setup: tub shower  Equipment owned: none  Prior Level of Function: Independent  with ADLs , Independent  with IADLs; using no AD for ambulation. Driving: yes  Occupation: retired    Pain Level: 0/10 nursing is aware  Cognition: A&O: 3/4; Follows 2 step directions   Memory:  good    Sequencing:  fair    Problem solving:  fair    Judgement/safety:  fair      Functional Assessment:   Initial Eval Status  Date: 3/31/19 Treatment Status  Date: Short Term Goals  Treatment frequency: PRN    Feeding Independent       Grooming Stand by Assist   Modified [place]    UB Dressing Stand by Assist   Modified [place]    LB Dressing Stand by Assist   Modified [place]    Bathing Minimal Assist  Risk for falls  Supervision    Toileting Stand by Assist   Modified [place]    Bed Mobility  Supine to sit: Independent   Sit to supine: Independent   Supine to sit: Independent   Sit to supine: Independent    Functional Transfers Sit to stand: Stand by Assist   Stand to sit: Stand by Assist   Stand pivot: Stand by Assist   Independent    Functional Mobility Min A with no AD  Functional household distance with 2 LOB  Pt refused to trial ww at this time.   indep vs Mod indep with AAD  For safe functional mobility during ADL/IADLs   Balance Sitting:     Static:  wfl    Dynamic:wfl  Standing: min A     Activity Tolerance fair  Good for ADLs Visual/  Perceptual Glasses: reading                Hand dominance: R  UE ROM: RUE:  WFL  LUE:  WFL  Strength: RUE: grossly 4/5 LUE: grossly 4/5   Strength: B WFL  Fine Motor Coordination:  B WFL    Hearing: WFL  Sensation:  No c/o numbness or tingling  Tone:  WFL  Edema: None noted                            Comments/Treatment: Upon arrival, patient in bed and agreeable to therapy. Therapist educated and facilitated pt on techniques to increase safety and independence with bed mobility, ADLs,  functional transfers, and functional mobility. Pt demonstrated impaired dynamic standing balance during functional mobility which makes him higher risk for falls. Pt declined use of AD. Pt instructed not to get OOB or ambulate without assistance. Skilled monitoring of HR, O2 sats and pts response to treatment. All WFL. Pt demonstrating fair understanding of education/techniques,  requiring additional training / education to improve safety and indep. At end of session, patient sitting EOB with call light and phone within reach, all lines and tubes intact. Pt would benefit from continued OT to increase functional independence and quality of life. Eval Complexity: Low    Assessment of current deficits   Functional mobility ? ADLs ? Strength ? Cognition ? Functional transfers  ? IADLs ? Safety Awareness ? Endurance ? Fine Motor Coordination ? Balance ? Vision/perception ? Sensation ? Gross Motor Coordination ? ROM ? Delirium ? Motor Control ? Plan of Care:   ADL retraining ? Equipment needs ? Neuromuscular re-education ? Energy Conservation Techniques ? Functional Transfer training ? Patient and/or Family Education ? Functional Mobility training ? Environmental Modifications ? Cognitive re-training ? Compensatory techniques for ADLs ? Splinting Needs ? Positioning to improve overall function ? Therapeutic Activity ? Therapeutic Exercise  ?   Visual/Perceptual: ?    Delirium

## 2019-03-31 NOTE — H&P
reports that he does not drink alcohol or use drugs. Family History:   family history includes Heart Disease in his father. REVIEW OF SYSTEMS:  As above in the HPI, otherwise negative    PHYSICAL EXAM:    Vitals:  /65   Pulse 72   Temp 97.4 °F (36.3 °C) (Temporal)   Resp 16   Ht 5' 7\" (1.702 m)   Wt 159 lb (72.1 kg)   SpO2 94%   BMI 24.90 kg/m²     General:  Awake, alert, oriented X 3. Well developed, well nourished, well groomed. No apparent distress. HEENT:  Normocephalic, atraumatic. Pupils equal, round, reactive to light. No scleral icterus. No conjunctival injection. Normal lips, teeth, and gums. No nasal discharge. Neck:  Supple  Heart:  RRR, no murmurs, gallops, rubs  Lungs:  CTA bilaterally, bilat symmetrical expansion, no wheeze, rales, or rhonchi  Abdomen:   Bowel sounds present, soft, nontender, no masses, no organomegaly, no peritoneal signs  Extremities:  No clubbing, cyanosis, or edema  Skin:  Warm and dry, no open lesions or rash  Neuro:  Cranial nerves 2-12 intact, no focal deficits  Breast: deferred  Rectal: deferred  Genitalia:  deferred    LABS:    CBC with Differential:    Lab Results   Component Value Date    WBC 7.4 03/31/2019    WBC 7.7 03/31/2019    RBC 2.38 03/31/2019    RBC 2.35 03/31/2019    HGB 7.6 03/31/2019    HGB 7.5 03/31/2019    HCT 22.9 03/31/2019    HCT 22.9 03/31/2019     03/31/2019     03/31/2019    MCV 96.2 03/31/2019    MCV 97.4 03/31/2019    MCH 31.9 03/31/2019    MCH 31.9 03/31/2019    MCHC 33.2 03/31/2019    MCHC 32.8 03/31/2019    RDW 14.1 03/31/2019    RDW 14.3 03/31/2019    LYMPHOPCT 28.0 03/31/2019    LYMPHOPCT 26.4 03/31/2019    MONOPCT 11.0 03/31/2019    MONOPCT 10.7 03/31/2019    EOSPCT 5 03/18/2019    BASOPCT 0.3 03/31/2019    BASOPCT 0.5 03/31/2019    MONOSABS 0.81 03/31/2019    MONOSABS 0.82 03/31/2019    LYMPHSABS 2.06 03/31/2019    LYMPHSABS 2.02 03/31/2019    EOSABS 0.65 03/31/2019    EOSABS 0.64 03/31/2019    BASOSABS 0.02 03/31/2019    BASOSABS 0.04 03/31/2019     CMP:    Lab Results   Component Value Date     03/31/2019     03/31/2019    K 4.2 03/31/2019    K 4.2 03/31/2019    K 4.2 03/31/2019     03/31/2019     03/31/2019    CO2 26 03/31/2019    CO2 27 03/31/2019    BUN 42 03/31/2019    BUN 43 03/31/2019    CREATININE 2.4 03/31/2019    CREATININE 2.4 03/31/2019    GFRAA 31 03/31/2019    GFRAA 31 03/31/2019    LABGLOM 25 03/31/2019    LABGLOM 25 03/31/2019    GLUCOSE 102 03/31/2019    GLUCOSE 100 03/31/2019    PROT 6.0 03/31/2019    PROT 6.0 03/31/2019    LABALBU 2.9 03/31/2019    LABALBU 2.9 03/31/2019    CALCIUM 8.5 03/31/2019    CALCIUM 8.6 03/31/2019    BILITOT <0.2 03/31/2019    BILITOT <0.2 03/31/2019    ALKPHOS 61 03/31/2019    ALKPHOS 63 03/31/2019    AST 11 03/31/2019    AST 12 03/31/2019    ALT 6 03/31/2019    ALT 6 03/31/2019     Magnesium:    Lab Results   Component Value Date    MG 2.1 03/31/2019     Phosphorus:  No results found for: PHOS  PT/INR:    Lab Results   Component Value Date    PROTIME 12.4 03/31/2019    INR 1.1 03/31/2019     Troponin:  No results found for: TROPONINI  U/A:  No results found for: NITRITE, COLORU, PROTEINU, PHUR, LABCAST, WBCUA, RBCUA, MUCUS, TRICHOMONAS, YEAST, BACTERIA, CLARITYU, SPECGRAV, LEUKOCYTESUR, UROBILINOGEN, BILIRUBINUR, BLOODU, GLUCOSEU, AMORPHOUS  HgBA1c:    Lab Results   Component Value Date    LABA1C 5.9 03/31/2019     Microalbumen/Creatinine ratio:  No components found for: RUCREAT  FLP:    Lab Results   Component Value Date    TRIG 101 12/03/2018    HDL 39 12/03/2018    LDLCALC 77 12/03/2018     TSH:  No results found for: TSH    No orders to display       ASSESSMENT:      Active Problems:    Essential hypertension    Benign prostatic hyperplasia without lower urinary tract symptoms    CKD (chronic kidney disease) stage 4, GFR 15-29 ml/min ([facility])    Diabetes mellitus type 2, uncontrolled ([address]    Hyperlipidemia LDL goal <100    Abdominal pain  Resolved Problems:    * No resolved hospital problems.  *      PLAN:    Adm  ivf  Labs  GSug for EGD/ Csope  Medications for other co morbidities cont as appropriate w dosage adjustments as necessary  PT/OT  DVT PPx  DC planning       Electronically signed by Garry Arias MD on 3/31/2019 at 12:48 PM

## 2019-03-31 NOTE — PROGRESS NOTES
scissored pattern. Pt is a high fall risk d/t the above, in addition to living home alone. Recommended to pt the use of AD but he refused. Did not want to hear education on use of AD. Seated EOB. Pt with all needs met and call light within reach. Pt will benefit from further skilled PT to address functional deficits described above. Pts/ family goals   1. Return home     Patient and or family understand(s) diagnosis, prognosis, and plan of care. Yes     PLAN  --PT care will be provided in accordance with the objectives noted above. Whenever appropriate, clear delegation orders will be provided for nursing staff. Exercises and functional mobility practice will be used as well as appropriate assistive devices or modalities to obtain goals. Patient and family education will also be administered as needed. --Frequency of treatments: 2-5x/week x 7-10 days.     Time in  0743  Time out  Esteban Delgado 98, PT, DPT  [id number]

## 2019-04-01 NOTE — PROGRESS NOTES
Physical Therapy  Facility/Department: [address] NEURO SPINE  Daily Treatment Note  NAME: Liv Murrell  : 1924  MRN: [id number]    Date of Service: 2019   Evaluating PT:  Lilian Patel, PT, DPT, [id number]     Room #:  1450/0185-B  Diagnosis:  Abdominal pain  Precautions:  Falls  Procedures: none   Equipment recommendations:  WW recommended, pt not receptive      Pt lives alone in a 1st floor apartment with 2 stair(s) to enter and 2 rail(s). Bed is on 1st floor and bath is on 1st floor. Pt ambulated with no AD PTA. Pt independent for ADL performance.                Initial Evaluation  Date: 3/31/19 Treatment   Short Term/ Long Term   Goals   AM-PAC 6 Clicks /     Was pt agreeable to Eval/treatment? Yes   with encouragement     Does pt have pain? Denies  No c/o pain       Bed Mobility  Rolling: NT  Supine to sit: Independent  Sit to supine: NT  Scooting: Independent Rolling: Independent   Supine to sit: Independent   Sit to supine: Independent   Scooting; Independent   Independent   Transfers Sit to stand: SBA  Stand to sit: SBA  Stand pivot: NT  Sit to stand: Supervision  Stand to sit: Supervision  Stand pivot: SBA Independent   Ambulation    100 feet with no AD Patrick    150 feet with no AD SBA  >200 feet with AAD PRN Mod I vs independent   Stair negotiation: ascended and descended  NT   >2 steps with 1 rail mod I   ROM BUE:  See OT eval  BLE:  WFL       Strength BUE:  See OT eval  BLE grossly:  5/5   5/5   Balance Sitting EOB:  independent  Dynamic Standing:  Patrick   Sitting EOB: Independent   Dynamic standing: SBA Sitting EOB:  Independent  Dynamic Standing:  Independent       Patient education  Pt was educated on Safety and importance of mobility.      Patient response to education:   Pt verbalized understanding Pt demonstrated skill Pt requires further education in this area   x x x     Additional Comments: Pt supine in bed on arrival and required encouragement for participation asking

## 2019-04-01 NOTE — OP NOTE
Dilan Gill. [address] [place] [address]      DATE OF PROCEDURE: 4/1/2019    SURGEON: Jagruti Montoya MD    ASSISTANT: Dr. Chaka Resendez M.D. PREOPERATIVE DIAGNOSES:   Weight loss, anemia    POSTOPERATIVE DIAGNOSES: same, GE junction mass, severe diverticulosis, poor prep, incomplete colonoscopy. OPERATION:    EGD esophagogastroduodenoscopy with biopsy  Incomplete colonoscopy    ANESTHESIA: LMAC    CONSENT AND INDICATIONS:  This is a 80y.o. year old male who is having weight loss and anemia. We have discussed with the patient and/or the patient representative the indication, alternatives, and the possible risks and/or complications of the planned procedure and the anesthesia methods. The patient and/or patient representative appear to understand and agree to proceed. Complications: none    OPERATIONS: The patient was placed on the table and sedated via LMAC. The throat was numbed with Cetacaine spray. Bite block was placed. A lubricated scope was easily passed into the upper esophagus which looked normal. The distal esophagus looked abnormal: with esophagitis and an erosive mass at the GE junction. The scope was passed into the stomach and retroflexed. 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 The scope was passed down toward the pylorus. 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 Random biopsies were taken. The mucosa was extremely friable. The scope was unable to pass into the pylorus due to stricture. The scope was then withdrawn. The patient tolerated the procedure well. The patient was kept in the left lateral decubitus position. A digital rectal exam was performed after the initiation of LMAC anesthesia and failed to reveal any obstructing masses or lesions.   A colonoscope was inserted into the patient's anus and passed through the rectum, and sigmoid, there was extensive diverticulosis with significant amount of liquid stool making colonoscopy very difficult. The procedure was aborted and the scope was then withdrawn. There were no masses, polyps, or lesions noted. The patient tolerated the procedure well and there were no complications. Dr. Osmin Stevenson was present for the entire procedure.            Physician Signature: Electronically signed by Dr. Vivi Roman M.D.

## 2019-04-01 NOTE — PROGRESS NOTES
GENERAL SURGERY  DAILY PROGRESS NOTE  4/1/2019    Subjective:  No events overnight. No nausea, vomiting, or bleeding. Finished 1/2 the prep, somewhat clear with some brown to stool. Objective:  BP (!) 123/58   Pulse 68   Temp 98.3 °F (36.8 °C) (Oral)   Resp 16   Ht 5' 7\" (1.702 m)   Wt 159 lb (72.1 kg)   SpO2 97%   BMI 24.90 kg/m²     General appearance: alert, cooperative and in no acute distress.   Eyes: grossly normal  Lungs: nonlabored breathing  Heart: regular rate  Abdomen: soft, non-tender, non distended    Recent Labs     04/01/19  0354 03/31/19  0443 03/18/19   WBC 7.8 7.4  7.7 9.6   HGB 7.4* 7.6*  7.5* 8.5*   HCT 22.6* 22.9*  22.9* 26.4*   MCV 97.0 96.2  97.4 97.8    205  207 264     Assessment/Plan:  80 y.o. male with weight loss, anemia    Monitor H/H  EGD and colonoscopy today  NPO until procedure     Electronically signed by Anya Maya MD on 4/1/2019 at 8:49 AM

## 2019-04-01 NOTE — CARE COORDINATION
Pt lives alone in a 1st floor apartment with 2 stair(s) to enter and 2 rail(s).  Bed is on 1st floor and bath is on 1st floor. His Pharmacy is [facility]  And he is walking 200 feet and the plan is home with no needs once he is medically ready .  I will follow Thanks [place]-Ellie

## 2019-04-01 NOTE — ANESTHESIA PRE PROCEDURE
Department of Anesthesiology  Preprocedure Note       Name:  Jose Murrell   Age:  80 y.o.  :  1924                                          MRN:  [id number]         Date:  2019      Surgeon: Dixie Manning):  MD Maryan Washburn MD    Procedure: EGD ESOPHAGOGASTRODUODENOSCOPY (N/A )  COLONOSCOPY DIAGNOSTIC (N/A )    Medications prior to admission:   Prior to Admission medications    Medication Sig Start Date End Date Taking? Authorizing Provider   Omega-3 Fatty Acids (OMEGA-3 FISH OIL PO) Take by mouth daily   Yes Historical Provider, MD   pantoprazole (PROTONIX) 40 MG tablet Take 1 tablet by mouth every morning (before breakfast) 3/13/19  Yes Sesar Louis MD   losartan-hydrochlorothiazide (HYZAAR) 50-12.5 MG per tablet Take 1 tablet by mouth daily 18  Yes Sesar Louis MD   metFORMIN (GLUCOPHAGE) 850 MG tablet Take 1 tablet by mouth daily (with breakfast) 18  Yes Sesra Louis MD   Mirabegron ER (MYRBETRIQ) 50 MG TB24 Take 50 mg by mouth daily 18  Yes Sesar Louis MD   simvastatin (ZOCOR) 10 MG tablet Take 1 tablet by mouth nightly 3/6/18  Yes Sesar Louis MD   ferrous sulfate 325 (65 Fe) MG tablet Take 325 mg by mouth daily (with breakfast)   Yes Historical Provider, MD   Pediatric Multivitamins-Fl (MULTI VIT/FL PO) Take by mouth   Yes Historical Provider, MD   JANUVIA 50 MG tablet Take 1 tablet by mouth daily 3/27/19   Sesar Louis MD   tamsulosin (FLOMAX) 0.4 MG capsule Take 1 capsule by mouth daily 12/11/18 3/27/19  Sesar Louis MD   blood glucose test strips (FREESTYLE TEST STRIPS) strip 1 each by In Vitro route 2 times daily As needed.  18   Sesar Louis MD   cyanocobalamin 1000 MCG/ML injection Inject 1,000 mcg into the muscle once Once Q4W    Historical Provider, MD       Current medications:    Current Facility-Administered Medications   Medication Dose Route Frequency Provider Last Rate Last Dose    dextrose 5 % and 0.45 % NaCl with KCl 20 mEq infusion   Intravenous Continuous Negar Cool, DO 50 mL/hr at 04/01/19 1004      linagliptin (TRADJENTA) tablet 5 mg  5 mg Oral Daily Michelle Yadav MD   5 mg at 03/31/19 0918    metFORMIN (GLUCOPHAGE) tablet 850 mg  850 mg Oral Daily with breakfast Michelle Yadav MD        Mirabegron ER TB24 50 mg  50 mg Oral Daily Michelle Yadav MD        pantoprazole (PROTONIX) tablet 40 mg  40 mg Oral QAM AC Michelle Yadav MD   40 mg at 03/31/19 3621    simvastatin (ZOCOR) tablet 10 mg  10 mg Oral Nightly Michelle Yadav MD   10 mg at 03/31/19 2206    tamsulosin (FLOMAX) capsule 0.4 mg  0.4 mg Oral Daily Michelle Yadav MD   0.4 mg at 03/31/19 2206    sodium chloride flush 0.9 % injection 10 mL  10 mL Intravenous 2 times per day Michelel Yadav MD   10 mL at 03/31/19 1251    sodium chloride flush 0.9 % injection 10 mL  10 mL Intravenous PRN Michelle Yadav MD   10 mL at 03/31/19 1234    magnesium hydroxide (MILK OF MAGNESIA) 400 MG/5ML suspension 30 mL  30 mL Oral Daily PRN Michelle Yadav MD        ondansetron [facility]) injection 4 mg  4 mg Intravenous Q6H PRN Michelle Yadav MD        glucose (GLUTOSE) 40 % oral gel 15 g  15 g Oral PRN Michelle Yadav MD        dextrose 50 % solution 12.5 g  12.5 g Intravenous PRN Michelle Yadav MD        glucagon (rDNA) injection 1 mg  1 mg Intramuscular PRN Michelle Yadav MD        dextrose 5 % solution  100 mL/hr Intravenous PRN Michelle Yadav MD        insulin lispro (HUMALOG) injection vial 0-6 Units  0-6 Units Subcutaneous TID WC Michelle Yadav MD        insulin lispro (HUMALOG) injection vial 0-3 Units  0-3 Units Subcutaneous Nightly Michelle Yadav MD        losartan (COZAAR) tablet 50 mg  50 mg Oral Daily Michelle Yadav MD   50 mg at 03/31/19 6022    And    hydrochlorothiazide (HYDRODIURIL) tablet 12.5 mg  12.5 mg Oral Daily Michelle Yadav MD   12.5 mg at 04/01/19 1004       Allergies:  No Known Allergies    Problem List:    Patient Active Problem List   Diagnosis Code    Essential hypertension I10    Benign prostatic hyperplasia without lower urinary tract symptoms N40.0    B12 deficiency E53.8    CKD (chronic kidney disease) stage 4, GFR 15-29 ml/min ([facility]) N18.4    Diabetes mellitus type 2, uncontrolled ([facility] E11.65    Hyperlipidemia LDL goal <100 E78.5    Abdominal pain R10.9    Anorexia R63.0       Past Medical History:        Diagnosis Date    Diabetes mellitus ([facility] [address]     Hypertension        Past Surgical History:        Procedure Laterality Date    APPENDECTOMY      HEMORRHOID SURGERY      banding       Social History:    Social History     Tobacco Use    Smoking status: Never Smoker    Smokeless tobacco: Never Used   Substance Use Topics    Alcohol use: No                                Counseling given: Not Answered      Vital Signs (Current):   Vitals:    03/31/19 2352 04/01/19 0445 04/01/19 0759 04/01/19 0815   BP: (!) 114/55 (!) 89/53 (!) 123/58    Pulse: 70 77 68    Resp: 16 16 16    Temp: 97.9 °F (36.6 °C) 98.6 °F (37 °C) 98.3 °F (36.8 °C)    TempSrc: Oral Temporal Oral    SpO2: 95%   97%   Weight:       Height:                                                  BP Readings from Last 3 Encounters:   04/01/19 (!) 123/58   03/27/19 104/60   03/22/19 121/72       NPO Status:                                                                                 BMI:   Wt Readings from Last 3 Encounters:   03/30/19 159 lb (72.1 kg)   03/27/19 159 lb (72.1 kg)   03/22/19 167 lb (75.8 kg)     Body mass index is 24.9 kg/m².     CBC:   Lab Results   Component Value Date    WBC 7.8 04/01/2019    RBC 2.33 04/01/2019    HGB 7.4 04/01/2019    HCT 22.6 04/01/2019    MCV 97.0 04/01/2019    RDW 14.3 04/01/2019     04/01/2019       CMP:   Lab Results   Component Value Date     04/01/2019    K 3.7 04/01/2019     04/01/2019    CO2 25 04/01/2019    BUN 40 04/01/2019    CREATININE 2.3

## 2019-04-01 NOTE — PROGRESS NOTES
Subjective:    Chief complaint:    No new issues    Objective:    BP (!) 123/58   Pulse 68   Temp 98.3 °F (36.8 °C) (Oral)   Resp 16   Ht 5' 7\" (1.702 m)   Wt 159 lb (72.1 kg)   SpO2 97%   BMI 24.90 kg/m²   General : Awake ,alert,no distress. Heart:  RRR, no murmurs, gallops, or rubs. Lungs:  CTA bilaterally, no wheeze, rales or rhonchi  Abd: bowel sounds present, nontender, nondistended, no masses  Extrem:  No clubbing, cyanosis, or edema    CBC:   Lab Results   Component Value Date    WBC 7.8 04/01/2019    RBC 2.33 04/01/2019    HGB 7.4 04/01/2019    HCT 22.6 04/01/2019    MCV 97.0 04/01/2019    MCH 31.8 04/01/2019    MCHC 32.7 04/01/2019    RDW 14.3 04/01/2019     04/01/2019    MPV 10.1 04/01/2019     BMP:    Lab Results   Component Value Date     04/01/2019    K 3.7 04/01/2019     04/01/2019    CO2 25 04/01/2019    BUN 40 04/01/2019    LABALBU 2.8 04/01/2019    CREATININE 2.3 04/01/2019    CALCIUM 7.9 04/01/2019    GFRAA 32 04/01/2019    LABGLOM 27 04/01/2019    GLUCOSE 99 04/01/2019     PT/INR:    Lab Results   Component Value Date    PROTIME 12.4 03/31/2019    INR 1.1 03/31/2019     Troponin:  No results found for: TROPONINI    No results for input(s): Jenae Left in the last 72 hours. No results for input(s): BC in the last 72 hours. No results for input(s): Brown Mires in the last 72 hours.       Current Facility-Administered Medications:     dextrose 5 % and 0.45 % NaCl with KCl 20 mEq infusion, , Intravenous, Continuous, Phu Friend DO, Last Rate: 50 mL/hr at 04/01/19 1004    linagliptin (TRADJENTA) tablet 5 mg, 5 mg, Oral, Daily, Adina Talbot MD, 5 mg at 03/31/19 1167    metFORMIN (GLUCOPHAGE) tablet 850 mg, 850 mg, Oral, Daily with breakfast, Adina Talbot MD    Mirabegron ER TB24 50 mg, 50 mg, Oral, Daily, Adina Talbot MD    pantoprazole (PROTONIX) tablet 40 mg, 40 mg, Oral, [facility] AC, Adina Talbot MD, 40 mg at 03/31/19 0641    simvastatin (ZOCOR) tablet 10 mg, 10 mg, Oral, Nightly, Cedrick Taylor MD, 10 mg at 03/31/19 2206    tamsulosin [facility]) capsule 0.4 mg, 0.4 mg, Oral, Daily, Cedrick Taylor MD, 0.4 mg at 03/31/19 2206    sodium chloride flush 0.9 % injection 10 mL, 10 mL, Intravenous, 2 times per day, Cedrick Taylor MD, 10 mL at 03/31/19 1251    sodium chloride flush 0.9 % injection 10 mL, 10 mL, Intravenous, PRN, Cedrick Taylor MD, 10 mL at 03/31/19 1234    magnesium hydroxide (MILK OF MAGNESIA) 400 MG/5ML suspension 30 mL, 30 mL, Oral, Daily PRN, Cedrick Taylor MD    ondansetron [facility]) injection 4 mg, 4 mg, Intravenous, Q6H PRN, Cedrick Taylor MD    glucose (GLUTOSE) 40 % oral gel 15 g, 15 g, Oral, PRN, Cedrick Taylor MD    dextrose 50 % solution 12.5 g, 12.5 g, Intravenous, PRN, Cedrick Taylor MD    glucagon (rDNA) injection 1 mg, 1 mg, Intramuscular, PRN, Cedrick Taylor MD    dextrose 5 % solution, 100 mL/hr, Intravenous, PRN, Cedrick Taylor MD    insulin lispro (HUMALOG) injection vial 0-6 Units, 0-6 Units, Subcutaneous, TID WC, Cedrick Taylor MD    insulin lispro (HUMALOG) injection vial 0-3 Units, 0-3 Units, Subcutaneous, Nightly, Cedrick Taylor MD    losartan (COZAAR) tablet 50 mg, 50 mg, Oral, Daily, 50 mg at 03/31/19 3217 **AND** hydrochlorothiazide (HYDRODIURIL) tablet 12.5 mg, 12.5 mg, Oral, Daily, Cedrick Taylor MD, 12.5 mg at 04/01/19 1004    Diet NPO, After Midnight Exceptions are: Sips with Meds    No orders to display       Assessment:    Active Problems:    Essential hypertension    Benign prostatic hyperplasia without lower urinary tract symptoms    CKD (chronic kidney disease) stage 4, GFR 15-29 ml/min ([facility])    Diabetes mellitus type 2, uncontrolled ([address]    Hyperlipidemia LDL goal <100    Abdominal pain    Anorexia  Resolved Problems:    * No resolved hospital problems.  *      Plan:    Feeling better since metformin dced  For scopes  Anemia could well be from CKD      Shan Rivera  1:51 PM  4/1/2019    NOTE: This report was transcribed using voice recognition software.  Every effort was made to ensure accuracy; however, inadvertent transcription errors may be present

## 2019-04-01 NOTE — PROGRESS NOTES
Dr. Di Thomas notified of patient only drinking approx 2/3 of Go-Lytely and refusing to drink anymore, pt is to have a colonoscopy and EGD today

## 2019-04-01 NOTE — PROGRESS NOTES
Pt admitted to PACU for brief observation post endo procedure. He is awake and alert. No complaints at this time.

## 2019-04-02 NOTE — PROGRESS NOTES
Subjective:    Chief complaint:    Feeling ok  No new complaints    Objective:    /64   Pulse 64   Temp 98.8 °F (37.1 °C) (Temporal)   Resp 16   Ht 5' 7\" (1.702 m)   Wt 159 lb (72.1 kg)   SpO2 96%   BMI 24.90 kg/m²   General : Awake ,alert,no distress. Heart:  RRR, no murmurs, gallops, or rubs. Lungs:  CTA bilaterally, no wheeze, rales or rhonchi  Abd: bowel sounds present, nontender, nondistended, no masses  Extrem:  No clubbing, cyanosis, or edema    CBC:   Lab Results   Component Value Date    WBC 8.2 04/02/2019    RBC 2.24 04/02/2019    HGB 7.2 04/02/2019    HCT 21.6 04/02/2019    MCV 96.4 04/02/2019    MCH 32.1 04/02/2019    MCHC 33.3 04/02/2019    RDW 13.9 04/02/2019     04/02/2019    MPV 10.2 04/02/2019     BMP:    Lab Results   Component Value Date     04/02/2019    K 3.7 04/02/2019     04/02/2019    CO2 25 04/02/2019    BUN 31 04/02/2019    LABALBU 2.7 04/02/2019    CREATININE 2.0 04/02/2019    CALCIUM 7.5 04/02/2019    GFRAA 38 04/02/2019    LABGLOM 31 04/02/2019    GLUCOSE 83 04/02/2019     PT/INR:    Lab Results   Component Value Date    PROTIME 12.4 03/31/2019    INR 1.1 03/31/2019     Troponin:  No results found for: TROPONINI    No results for input(s): Flynn Gamma in the last 72 hours. No results for input(s): BC in the last 72 hours. No results for input(s): Naima Ann in the last 72 hours.       Current Facility-Administered Medications:     benzonatate (TESSALON) capsule 100 mg, 100 mg, Oral, TID PRN, Viridiana Mars MD, 100 mg at 04/02/19 1044    linagliptin (TRADJENTA) tablet 5 mg, 5 mg, Oral, Daily, Siobhan Olivares MD, 5 mg at 04/02/19 1044    Mirabegron ER TB24 50 mg, 50 mg, Oral, Daily, Siobhan Olivares MD    pantoprazole (PROTONIX) tablet 40 mg, 40 mg, Oral, QAM AC, Siobhan Olivares MD, 40 mg at 03/31/19 0641    simvastatin (ZOCOR) tablet 10 mg, 10 mg, Oral, Nightly, Siobhan Olivares MD, 10 mg at 04/01/19 2209    tamsulosin (FLOMAX) capsule 0.4 mg, 0.4 mg, Oral, Daily, Brijesh Norris MD, 0.4 mg at 04/01/19 2209    sodium chloride flush 0.9 % injection 10 mL, 10 mL, Intravenous, 2 times per day, Brijesh Norris MD, 10 mL at 03/31/19 1251    sodium chloride flush 0.9 % injection 10 mL, 10 mL, Intravenous, PRN, Brijesh Norris MD, 10 mL at 03/31/19 1234    magnesium hydroxide (MILK OF MAGNESIA) 400 MG/5ML suspension 30 mL, 30 mL, Oral, Daily PRN, Brijesh Norris MD    ondansetron [facility]) injection 4 mg, 4 mg, Intravenous, Q6H PRN, Brijesh Norrsi MD    glucose (GLUTOSE) 40 % oral gel 15 g, 15 g, Oral, PRN, Brijesh Norris MD    dextrose 50 % solution 12.5 g, 12.5 g, Intravenous, PRN, Brijesh Norris MD    glucagon (rDNA) injection 1 mg, 1 mg, Intramuscular, PRN, Brijesh Norris MD    dextrose 5 % solution, 100 mL/hr, Intravenous, PRN, Brijesh Norris MD    insulin lispro (HUMALOG) injection vial 0-6 Units, 0-6 Units, Subcutaneous, TID WC, Brijesh Norris MD    insulin lispro (HUMALOG) injection vial 0-3 Units, 0-3 Units, Subcutaneous, Nightly, Brijesh Norris MD    losartan (COZAAR) tablet 50 mg, 50 mg, Oral, Daily, 50 mg at 04/02/19 1043 **AND** hydrochlorothiazide (HYDRODIURIL) tablet 12.5 mg, 12.5 mg, Oral, Daily, Brijesh Norris MD, 12.5 mg at 04/02/19 1044    DIET CARB CONTROL;    FL BARIUM ENEMA    (Results Pending)       Assessment:    Active Problems:    Essential hypertension    Benign prostatic hyperplasia without lower urinary tract symptoms    CKD (chronic kidney disease) stage 4, GFR 15-29 ml/min ([facility])    Diabetes mellitus type 2, uncontrolled ([address]    Hyperlipidemia LDL goal <100    Abdominal pain    Anorexia  Resolved Problems:    * No resolved hospital problems. *  GE junction mass     Plan:    Await biopsy  Discharge with out patient follow up  Options are limited if biopsy shows malignancy    Shan Rivera  1:32 PM  4/2/2019    NOTE: This report was transcribed using voice recognition software. Every effort was made to ensure accuracy; however, inadvertent transcription errors may be present

## 2019-04-02 NOTE — ANESTHESIA POSTPROCEDURE EVALUATION
Department of Anesthesiology  Postprocedure Note    Patient: Dereck Murrell  MRN: [id number]  YOB: 1924  Date of evaluation: 4/2/2019  Time:  6:24 AM     Procedure Summary     Date:  04/01/19 Room / Location[place]  [facility] 02 [facility] [place]    Anesthesia Start:  8608 Anesthesia Stop:  3446    Procedures:       EGD BIOPSY (N/A )      COLONOSCOPY DIAGNOSTIC (N/A ) Diagnosis:  (?)    Surgeon:  Venus Jacobs MD Responsible Provider:  Kennedi Tom MD    Anesthesia Type:  MAC ASA Status:  3          Anesthesia Type: MAC    Cyndie Phase I: Cyndie Score: 10    Cyndie Phase II:      Last vitals: Reviewed and per EMR flowsheets.        Anesthesia Post Evaluation    Patient location during evaluation: PACU  Patient participation: complete - patient participated  Level of consciousness: awake  Airway patency: patent  Nausea & Vomiting: no nausea and no vomiting  Complications: no  Cardiovascular status: hemodynamically stable  Respiratory status: acceptable  Hydration status: stable

## 2019-04-02 NOTE — DISCHARGE INSTR - DIET

## 2019-04-02 NOTE — PROGRESS NOTES
Patient updated on anticipated discharge today. States he will call his daughter to see when she could pick him up. Instructed to notify this nurse when he knows what time she is available, or if he has any difficulties setting up transportation. Electronically signed by Roula Mark RN on 4/2/2019 at 2:43 PM    Dr. Constance Vargas wants patient to eat dinner here to see if he tolerates prior to discharge. Patient updated. Electronically signed by Roula Mark RN on 4/2/2019 at 2:47 PM    Patient eating dinner now.     Electronically signed by Roula Mark RN on 4/2/2019 at 5:56 PM

## 2019-04-02 NOTE — PROGRESS NOTES
RN Discharge Note    The information on all pages of the After Visit Summary (discharge instructions) has been reviewed with the pt and family at the bedside. Had the opportunity to ask questions regarding this information. A complete copy of the AVS has been given to the patient. Pt and family verbalized no concerns or issues with discharge. Pt instructed to follow up with PCP ASAP and if symptoms worsen/persist. Instructed to seek emergency care when necessary.     Electronically signed by Doyle Holcomb RN on 4/2/19 at 6:29 PM

## 2019-04-02 NOTE — CARE COORDINATION
The patient is up and about in the room and is feeling better this am. Plan remains home with no needs.  I will continue to follow Thanks Deepthi-Ellie

## 2019-04-02 NOTE — PROGRESS NOTES
Spoke with Dr. Jimmy Bedolla re: discharge. States patient should remain inpatient at least 1 more day. Monitor for patient tolerating diet. Message sent to Dr. Zach Corado to update. Electronically signed by Darron Harman RN on 4/2/2019 at 1:58 PM    Received message from Dr. Zach Corado that he spoke with Dr. Cecy Mukherjee and patient is okay for discharge. Dr. Jimmy Bedolla called the unit and spoke with Tanvi Lehman RN. She states he said okay for discharge today.     Electronically signed by Darron Harman RN on 4/2/2019 at 2:40 PM

## 2019-04-02 NOTE — PROGRESS NOTES
Spoke with Marlon Velasquez in Environmental ext [phone].  Requested pt be next on list.    Electronically signed by William Thakur RN on 4/2/2019 at 7:48 PM

## 2019-04-05 NOTE — TELEPHONE ENCOUNTER
Dr. Dylan Gao-  - Would patient benefit from a reduced dose of Mirabegron since  recommends a max of 25 mg for CrCl 15-30, pt calculated to be 22 ml/min    Thank you,  Roseline Garcia, PharmD, [facility]  Pharmacist  Direct: [phone], Ext [phone]  ============================================  CLINICAL PHARMACY NOTE  Post-Discharge Transitions of Care (ALLA)    Non-face-to-face services provided:  Assessment and support for treatment adherence and medication management-- med rec    Subjective/Objective:  Holly Murrell is a 80 y.o. male. Patient was discharged from [facility] on 4/2/19 with a diagnosis of abdominal pain. Patient outreach to review discharge medications and provide medication review and management. Spoke with patient. Patient states he is still having the same symptoms he had that led him to go to the hospital. Loss of appetite and stomach upset. He does take the PPI 30 min prior to breakfast. Has fu with PCP, kidney dr, and FU regarding his scope. No Known Allergies    Discharge Medications (as per current medication list/AVS):  There are NEW medications for you.  START taking them after you leave the hospital:  na  You told us you were taking these medications at home, but the amount or how often you take this medication has CHANGED:  na  These are medications you told us you were taking at home, CONTINUE taking them after you leave the hospital:  Medication Sig    JANUVIA 50 MG tablet Take 1 tablet by mouth daily    Omega-3 Fatty Acids (OMEGA-3 FISH OIL PO) Take 1 capsule by mouth daily     pantoprazole (PROTONIX) 40 MG tablet Take 1 tablet by mouth every morning (before breakfast)    tamsulosin (FLOMAX) 0.4 MG capsule Take 1 capsule by mouth daily    losartan-hydrochlorothiazide (HYZAAR) 50-12.5 MG per tablet Take 1 tablet by mouth daily    Mirabegron ER (MYRBETRIQ) 50 MG TB24 Take 50 mg by mouth daily    simvastatin (ZOCOR) 10 MG tablet Take 1 tablet by mouth nightly    cyanocobalamin 1000 MCG/ML injection Inject 1,000 mcg into the muscle once Once Q4W    ferrous sulfate 325 (65 Fe) MG tablet Take 325 mg by mouth daily (with breakfast)    Pediatric Multivitamins-Fl (MULTI VIT/FL PO) Take by mouth    blood glucose test strips (FREESTYLE TEST STRIPS) strip 1 each by In Vitro route 2 times daily As needed. Current Facility-Administered Medications   Medication Dose Route Frequency Provider Last Rate Last Dose    cyanocobalamin injection 1,000 mcg  1,000 mcg Intramuscular Once Nancy Dos Santos MD           These are the medications you have told us you were taking at home, STOP taking them after you leave the hospital:  Metformin- states he did stop taking    Additional Medications:  na      Estimated Creatinine Clearance: 21 mL/min (A) (based on SCr of 2 mg/dL (H)). Assessment/Plan:    - Pt is taking medications as directed by discharging physician. Number of discrepancies: 1. Instructions per discharge list provided except per below documentation. Identified medication discrepancies/issues:   Category 3 (1): reduce dos of mirabegron to 25 mg daily according to current renal function. · Category 4 (1):  1. Updated med list- fish oil dose    - Identified Potential Medication Interactions: No clinically significant interactions identified via cfgAdvance Interaction Analysis as category D or higher.    - Renal Dosing: According to [employer], the following should have renal adjustment: Mirabegron- Crcl 15-30- no more than 25 mg per day.     - Follow up appointment date (7 days for more severe illness, 14 days for others):    · Patient was reminded of upcoming appointment- 4/10/19    Thank you,    Charli Cadena, PharmD, [facility] Pharmacist  Direct: [phone], Ext [phone]  ======================================

## 2019-04-10 NOTE — TELEPHONE ENCOUNTER
Dr. Juan David Miller-  - script pended for Mirabegron 25 mg daily as discussed. - message left for patient regarding this information.      Thank you,  Charli Cadena, PharmD, [facility] Pharmacist  Direct: [phone], Ext [phone]  ===================================  CLINICAL PHARMACY NOTE   POST-DISCHARGE TELEPHONE FOLLOW-UP ADDENDUM    For Pharmacy Admin Tracking Only    TCM Call Made?: No  [facility]) Select Patient?: Yes  Total # of Interventions Recommended: 2 - Decreased Dose #: 1  - Updated Order #: 1  Total # Interventions Accepted: 2  Intervention Severity:   - Level 1 Intervention Present?: No   - Level 2 #: 0   - Level 3 #: 1  Outreach Status: Review Complete  Care Coordinator Outreach to Patient?: No  Provider Contacted?: Yes - Note Routed, Routine  Time Spent (min): 30    Additional Documentation: No

## 2019-04-15 NOTE — ED NOTES
Bed: 08  Expected date:   Expected time:   Means of arrival:   Comments:  ems     Alex Nguyen RN  04/15/19 0164

## 2019-04-15 NOTE — ED NOTES
Spoke with patient's grand-daughter--she is on her way to  patient      Guerline Felton  04/15/19 5820

## 2019-04-15 NOTE — ED PROVIDER NOTES
Patient is an 80-year-old male with history of hypertension, type II diabetes, BPH, hyperlipidemia, chronic kidney disease, who presents to the ED for nausea and vomiting. Patient states that it started last night. Zofran was given by EMS, and patient feels much better at this time. He states that he had \"too many to count\" episodes of nonbloody and nonbilious vomiting. Patient states that he denies any abdominal pain or epigastric abdominal pain at this time. He states that he feels much better, but this morning, was too unbearable to withstand, and decided coming to the ED for further evaluation. Patient denies any chest pain or shortness of breath. The history is provided by the patient. No  was used. Review of Systems   Constitutional: Negative for chills and fever. Respiratory: Negative for cough, shortness of breath and wheezing. Cardiovascular: Negative for chest pain and palpitations. Gastrointestinal: Positive for nausea and vomiting. Negative for abdominal pain, constipation and diarrhea. Genitourinary: Negative for dysuria and hematuria. Musculoskeletal: Negative for neck pain and neck stiffness. Skin: Negative for color change, pallor, rash and wound. Neurological: Negative for dizziness, syncope, light-headedness, numbness and headaches. Psychiatric/Behavioral: Negative for confusion and decreased concentration. The patient is not nervous/anxious. Physical Exam   Constitutional: He is oriented to person, place, and time. He appears well-developed and well-nourished. No distress. HENT:   Head: Normocephalic and atraumatic. Right Ear: External ear normal.   Left Ear: External ear normal.   Mouth/Throat: No oropharyngeal exudate. Eyes: Pupils are equal, round, and reactive to light. EOM are normal.   Neck: Normal range of motion. Cardiovascular: Normal rate, regular rhythm and intact distal pulses. Exam reveals no gallop and no friction rub. mmol/L    Potassium 4.1 3.5 - 5.0 mmol/L    Chloride 104 98 - 107 mmol/L    CO2 24 22 - 29 mmol/L    Anion Gap 13 7 - 16 mmol/L    Glucose 111 (H) 74 - 99 mg/dL    BUN 26 (H) 8 - 23 mg/dL    CREATININE 2.5 (H) 0.7 - 1.2 mg/dL    GFR Non-African American 24 >=60 mL/min/1.73    GFR African American 29     Calcium 8.7 8.6 - 10.2 mg/dL    Total Protein 6.6 6.4 - 8.3 g/dL    Alb 3.2 (L) 3.5 - 5.2 g/dL    Total Bilirubin <0.2 0.0 - 1.2 mg/dL    Alkaline Phosphatase 63 40 - 129 U/L    ALT 7 0 - 40 U/L    AST 18 0 - 39 U/L   CBC Auto Differential   Result Value Ref Range    WBC 8.7 4.5 - 11.5 E9/L    RBC 2.66 (L) 3.80 - 5.80 E12/L    Hemoglobin 8.3 (L) 12.5 - 16.5 g/dL    Hematocrit 26.0 (L) 37.0 - 54.0 %    MCV 97.7 80.0 - 99.9 fL    MCH 31.2 26.0 - 35.0 pg    MCHC 31.9 (L) 32.0 - 34.5 %    RDW 14.4 11.5 - 15.0 fL    Platelets 570 617 - 029 E9/L    MPV 10.0 7.0 - 12.0 fL    Neutrophils % 61.9 43.0 - 80.0 %    Immature Granulocytes % 0.7 0.0 - 5.0 %    Lymphocytes % 24.5 20.0 - 42.0 %    Monocytes % 10.5 2.0 - 12.0 %    Eosinophils % 2.2 0.0 - 6.0 %    Basophils % 0.2 0.0 - 2.0 %    Neutrophils # 5.38 1.80 - 7.30 E9/L    Immature Granulocytes # 0.06 E9/L    Lymphocytes # 2.13 1.50 - 4.00 E9/L    Monocytes # 0.91 0.10 - 0.95 E9/L    Eosinophils # 0.19 0.05 - 0.50 E9/L    Basophils # 0.02 0.00 - 0.20 E9/L   Lipase   Result Value Ref Range    Lipase 77 (H) 13 - 60 U/L   Urinalysis   Result Value Ref Range    Color, UA Yellow Straw/Yellow    Clarity, UA Clear Clear    Glucose, Ur Negative Negative mg/dL    Bilirubin Urine SMALL (A) Negative    Ketones, Urine 40 (A) Negative mg/dL    Specific Gravity, UA >=1.030 1.005 - 1.030    Blood, Urine TRACE-INTACT Negative    pH, UA 5.5 5.0 - 9.0    Protein, UA TRACE Negative mg/dL    Urobilinogen, Urine 0.2 <2.0 E.U./dL    Nitrite, Urine Negative Negative    Leukocyte Esterase, Urine SMALL (A) Negative   Lactate, Sepsis   Result Value Ref Range    Lactic Acid, Sepsis 1.3 0.5 - 1.9 mmol/L Microscopic Urinalysis   Result Value Ref Range    Casts RARE /LPF    WBC, UA 10-20 (A) 0 - 5 /HPF    RBC, UA 2-5 0 - 2 /HPF    Bacteria, UA FEW (A) /HPF   POCT Glucose   Result Value Ref Range    Meter Glucose 107 (H) 74 - 99 mg/dL   POCT Venous   Result Value Ref Range    POC Sodium 142 132 - 146 mmol/L    POC Potassium 4.1 3.5 - 5.0 mmol/L    POC Chloride 111 (H) 100 - 108 mmol/L    POC Glucose 102 (H) 74 - 99 mg/dl    POC Creatinine 2.2 (H) 0.7 - 1.2 mg/dL    GFR Non-African American 28 >=60 mL/min/1.73    GFR  34     Performed on SEE BELOW        Radiology:  CT ABDOMEN PELVIS WO CONTRAST Additional Contrast? None   Final Result   Diffuse wall thickening and  haziness involving the stomach concerning   for gastritis. Infiltrating malignancy like lymphoma, May have a   similar appearance and consider endoscopic assessment. Diverticulosis left hemicolon without diverticulitis and abdominal   aortic aneurysm. ------------------------- NURSING NOTES AND VITALS REVIEWED ---------------------------  Date / Time Roomed:  4/15/2019  8:51 AM  ED Bed Assignment:  ONOFRE/ONOFRE    The nursing notes within the ED encounter and vital signs as below have been reviewed. BP (!) 121/56   Pulse 86   Temp 97.9 °F (36.6 °C) (Oral)   Resp 17   Ht 5' 7\" (1.702 m)   Wt 158 lb (71.7 kg)   SpO2 96%   BMI 24.75 kg/m²   Oxygen Saturation Interpretation: Normal      ------------------------------------------ PROGRESS NOTES ------------------------------------------  ED Course as of Apr 15 1628   Mon Apr 15, 2019   1301 Patient resting comfortably in no acute distress. Would like to go home, no active emesis. [KS]      ED Course User Index  [KS] Dc Scherer DO         4:28 PM  I have spoken with the patient and discussed todays results, in addition to providing specific details for the plan of care and counseling regarding the diagnosis and prognosis.   Their questions are answered at this time and they are agreeable with the plan. I discussed at length with them reasons for immediate return here for re evaluation. They will followup with their primary care physician by calling their office on Monday.      --------------------------------- ADDITIONAL PROVIDER NOTES ---------------------------------  At this time the patient is without objective evidence of an acute process requiring hospitalization or inpatient management. They have remained hemodynamically stable throughout their entire ED visit and are stable for discharge with outpatient follow-up. The plan has been discussed in detail and they are aware of the specific conditions for emergent return, as well as the importance of follow-up. New Prescriptions    ONDANSETRON (ZOFRAN ODT) 8 MG TBDP DISINTEGRATING TABLET    Take 1 tablet by mouth every 8 hours as needed for Nausea       Diagnosis:  1. Non-intractable vomiting with nausea, unspecified vomiting type        Disposition:  Patient's disposition: Discharge to home  Patient's condition is stable. MDM  Number of Diagnoses or Management Options  Non-intractable vomiting with nausea, unspecified vomiting type:   Diagnosis management comments: Patient is a 42-year-old male who presents to the ED with nausea and vomiting. Patient was evaluated, found to have no active nausea and vomiting at this time. Patient was otherwise resting comfortably in no acute distress. Patient was given Zofran prior to arrival via EMS, and stated that when he came to the ED, he felt markedly better already. He was given an additional dose of Zofran here in the ED. Patient CT scan showed diffuse wall thickening and haziness involving the stomach concerning for gastritis, possibly infiltrating malignancy like lymphoma. Unlikely at this time as the patient 2 weeks ago had a EGD and scope performed which did not show any malignancy and or malignant tissue.  Patient will be discharged at this time and instructed to follow-up with his PCP as well as gastroenterologist. He was in agreement with discharge at this time. Patient was discharged, told, to the ED if his symptoms worsen. Amount and/or Complexity of Data Reviewed  Clinical lab tests: ordered and reviewed  Tests in the radiology section of CPT®: ordered and reviewed        ED Course as of Apr 15 1628   Mon Apr 15, 2019   1301 Patient resting comfortably in no acute distress. Would like to go home, no active emesis.      [KS]      ED Course User Index  [KS] DO Avani Luo DO  Resident  04/15/19 2668

## 2019-04-15 NOTE — ED NOTES
ATTENDING PROVIDER ATTESTATION:     Holly DueñasXavier Murrell presented to the emergency department for evaluation of Emesis (since lastnight, given 4mg zofran by EMS)   and was initially evaluated by the Medical Resident. See Original ED Note for H&P and ED course above. I have reviewed and discussed the case, including pertinent history (medical, surgical, family and social) and exam findings with the Medical Resident assigned to Holly Avelarco.  I have personally performed and/or participated in the history, exam, medical decision making, and procedures and agree with all pertinent clinical information and any additional changes or corrections are noted below in my assessment and plan. I have discussed this patient in detail with the resident, and provided the instruction and education,       I have reviewed my findings and recommendations with the assigned Medical Resident, Holly Murrell and members of family present at the time of disposition. Review of Systems:   Pertinent positives and negatives are stated within HPI, all other systems reviewed and are negative.    --------------------------------------------- PAST HISTORY ---------------------------------------------  Past Medical History:  has a past medical history of Diabetes mellitus ([facility] [address] and Hypertension. Past Surgical History:  has a past surgical history that includes Hemorrhoid surgery; Appendectomy; Upper gastrointestinal endoscopy (N/A, 4/1/2019); and Colonoscopy (N/A, 4/1/2019). Social History:  reports that he has never smoked. He has never used smokeless tobacco. He reports that he does not drink alcohol or use drugs. Family History: family history includes Heart Disease in his father. The patients home medications have been reviewed. Allergies: Patient has no known allergies.              nausea and vomiting  Minimal abdominal pain  Non toxic appearing  Improving with medications in the ED  He is feeling better and wants to go home  Abdomen soft, very minimal tenderness but hardly any tenderness at all. No rebound, guarding or rigidity. Not distended.      1. Non-intractable vomiting with nausea, unspecified vomiting type           Oswaldo Buckley MD  04/15/19 2029

## 2019-04-18 PROBLEM — R26.2 UNABLE TO AMBULATE: Status: ACTIVE | Noted: 2019-01-01

## 2019-04-18 NOTE — CARE COORDINATION
Social Work Discharge Planning -     Pt was presented to the ED for a fall last night. SW met with pt bedside, pt was alert and oriented x3. Pt lives alone in an apartment on the 1st floor. Per pt he is independent with all ADL's and still drives. Pt has no hx of HHC and hx of JACKELINE a long time ago for his back. Per pt he uses a wheeled walker to ambulate. Pts PCP is Dr. Aurelio Roberts and he uses [facility] in [facility]. SW spoke to pt about discharge options, per pt he would like to return home. Pt requested SW call his daughter to notify her that he was in the ED. SW left voice message for pts daughter. Assigned SW to follow.      Karissa Shankar MSW Intern   Reviewed by, KAREL Christie

## 2019-04-18 NOTE — ED PROVIDER NOTES
27-year-old male history of type II diabetes, hyperlipidemia, chronic kidney disease, anemia, brought to the emergency department by EMS for fall. Patient was found on the ground at his home after he called EMS. According to the patient he fell from standing position after tripping yesterday evening around 11 PM. Since that time he's been on the ground and unable to stand up due to weakness. He states he is normally unable to get up due to weakness, however he utilizes a walker for ambulation. He admits to hitting his head, denies loss of consciousness. He was unable to access food or water while he was down on the ground. Presently he is complaining of mild right elbow pain. Denies any chest pain service of breath, nausea, vomiting. Does note chronic chills. Review of Systems   Constitutional: Positive for chills and fatigue. Negative for fever. HENT: Negative for ear pain, sinus pressure and sore throat. Eyes: Negative for pain, discharge and redness. Respiratory: Negative for cough, shortness of breath and wheezing. Cardiovascular: Negative for chest pain. Gastrointestinal: Negative for abdominal pain, diarrhea, nausea and vomiting. Melena   Genitourinary: Negative for dysuria and frequency. Musculoskeletal: Negative for arthralgias and back pain. Skin: Positive for wound. Negative for rash. Neurological: Positive for weakness. Negative for headaches. Hematological: Negative for adenopathy. All other systems reviewed and are negative. Physical Exam   Constitutional: He is oriented to person, place, and time. He appears well-developed and well-nourished. Patient laying supine, appears pale, no acute distress   HENT:   Head: Normocephalic.    Right Ear: External ear normal.   Left Ear: External ear normal.   Mouth/Throat: Oropharynx is clear and moist.   Mildly tender raised erythematous lesion noted to the patient's occiput   Eyes: Pupils are equal, round, and reactive to light. Neck: Normal range of motion. Neck supple. Conjunctiva are pale   Cardiovascular: Normal rate, regular rhythm and normal heart sounds. No murmur heard. Pulmonary/Chest: Effort normal and breath sounds normal. No respiratory distress. He has no wheezes. He has no rales. Abdominal: Soft. Bowel sounds are normal. There is no tenderness. There is no rebound and no guarding. Musculoskeletal: He exhibits edema. 2+ pitting edema noted to the patient's left lower extremity extending from the foot to the proximal knee. Patient states this is chronic   Neurological: He is alert and oriented to person, place, and time. No cranial nerve deficit. Coordination normal.   Skin: Skin is warm and dry. Capillary refill takes less than 2 seconds. Nursing note and vitals reviewed. Procedures    MDM  Number of Diagnoses or Management Options  Diagnosis management comments: 70-year-old male brought to the emergency department by EMS after ground-level fall, patient was down on the floor without food or water for greater than 12 hours. Complaining of mild head pain, as well as right elbow pain. Physical exam notable for a lesion to the right elbow, small raised wound to the patient's occiput. He is [place] and the [facility]. States that he is too weak to get up. Also complaining of weakness for some time. Physical exam also notable for #conjunctiva. Patient elicits history of melena after taking iron. ED Course as of Apr 19 2231   Thu Apr 18, 2019   1825 Patient is refusing x-ray of the elbow, I discussed options and reasons why we would like to get imaging, he is still refusing.    [KS]      ED Course User Index  [KS] Rayna Martins DO       EKG: This EKG is signed and interpreted by me.     Rate: 75  Rhythm: Sinus  Interpretation: Right bundle branch block, no acute ST elevations or depressions, sinus arrhythmia occasional PAC  Comparison: No acute changes when compared to previous EKG on Value Ref Range    Sodium 142 132 - 146 mmol/L    Potassium reflex Magnesium 3.7 3.5 - 5.0 mmol/L    Chloride 105 98 - 107 mmol/L    CO2 24 22 - 29 mmol/L    Anion Gap 13 7 - 16 mmol/L    Glucose 74 74 - 99 mg/dL    BUN 25 (H) 8 - 23 mg/dL    CREATININE 2.7 (H) 0.7 - 1.2 mg/dL    GFR Non-African American 22 >=60 mL/min/1.73    GFR African American 27     Calcium 8.7 8.6 - 10.2 mg/dL    Total Protein 6.3 (L) 6.4 - 8.3 g/dL    Alb 3.0 (L) 3.5 - 5.2 g/dL    Total Bilirubin 0.3 0.0 - 1.2 mg/dL    Alkaline Phosphatase 59 40 - 129 U/L    ALT 11 0 - 40 U/L    AST 31 0 - 39 U/L   Troponin   Result Value Ref Range    Troponin 0.03 0.00 - 0.03 ng/mL   Urinalysis, reflex to microscopic   Result Value Ref Range    Color, UA Yellow Straw/Yellow    Clarity, UA Clear Clear    Glucose, Ur Negative Negative mg/dL    Bilirubin Urine Negative Negative    Ketones, Urine 15 (A) Negative mg/dL    Specific Gravity, UA >=1.030 1.005 - 1.030    Blood, Urine MODERATE (A) Negative    pH, UA 5.5 5.0 - 9.0    Protein, UA Negative Negative mg/dL    Urobilinogen, Urine 0.2 <2.0 E.U./dL    Nitrite, Urine Negative Negative    Leukocyte Esterase, Urine Negative Negative   Microscopic Urinalysis   Result Value Ref Range    Casts RARE /LPF    WBC, UA 2-5 0 - 5 /HPF    RBC, UA 5-10 (A) 0 - 2 /HPF    Epi Cells FEW /HPF    Renal Epithelial, Urine RARE /HPF    Bacteria, UA FEW (A) /HPF   CK   Result Value Ref Range    Total  (H) 20 - 200 U/L   Basic metabolic panel   Result Value Ref Range    Sodium 141 132 - 146 mmol/L    Potassium 3.8 3.5 - 5.0 mmol/L    Chloride 108 (H) 98 - 107 mmol/L    CO2 25 22 - 29 mmol/L    Anion Gap 8 7 - 16 mmol/L    Glucose 82 74 - 99 mg/dL    BUN 23 8 - 23 mg/dL    CREATININE 2.4 (H) 0.7 - 1.2 mg/dL    GFR Non-African American 25 >=60 mL/min/1.73    GFR African American 31     Calcium 7.9 (L) 8.6 - 10.2 mg/dL   POCT Glucose   Result Value Ref Range    Meter Glucose 72 (L) 74 - 99 mg/dL   EKG 12 Lead   Result Value Ref Range    Ventricular Rate 75 BPM    Atrial Rate 85 BPM    P-R Interval 188 ms    QRS Duration 144 ms    Q-T Interval 482 ms    QTc Calculation (Bazett) 538 ms    R Axis -33 degrees    T Axis 74 degrees       RADIOLOGY:  Ct Abdomen Pelvis Wo Contrast Additional Contrast? None    Result Date: 4/15/2019  Patient MRN:  [id number] : 1924 Age: 80 years Gender: Male Order Date:  4/15/2019 9:30 AM EXAM: CT ABDOMEN PELVIS WO CONTRAST number of images 327. Technique: Low-dose CT  acquisition technique included one of following options; 1 . Automated exposure control, 2. Adjustment of MA and or KV according to patient's size or 3. Use of iterative reconstruction. INDICATION:  ABDOMINAL PAIN  COMPARISON: None FINDINGS: The lung bases demonstrate mild cardiomegaly. There is coronary artery calcification. Liver is of normal architecture. Gallbladder, spleen, pancreas, adrenals and the kidneys are unremarkable except for a 1.5 cm cystic lesion in the right kidney. There is distended stomach with diffusely thickened stomach wall especially the antrum and pylorus with some haziness. There is abdominal aortic aneurysm measuring 2.7 x 2.9 cm. There is diffuse degenerative changes the lumbar spine. Pelvis. There is diverticulosis descending and rectosigmoid colon. The prostate gland is prominent measuring 2.8 x 4.7 cm. Diffuse wall thickening and  haziness involving the stomach concerning for gastritis. Infiltrating malignancy like lymphoma, May have a similar appearance and consider endoscopic assessment. Diverticulosis left hemicolon without diverticulitis and abdominal aortic aneurysm. Ct Head Wo Contrast    Result Date: 2019  Patient MRN: [id number] : 1924 Age:  80 years Gender: Male Order Date: 2019 2:30 PM Exam: CT HEAD WO CONTRAST Number of Images: 149 views Indication:   fall\  Comparison: None.  Technique: Sequential axial CT of the head was obtained from the base of the skull to the vertex without IV contrast.  Radiation Output: CTDIvol 76.16 (mGy); DLP 1368 (mGy-cm) Findings: The study demonstrates the fourth ventricle to be midline. The posterior fossa appears to be normal. There is no mass, mass effect or midline shift. There is global atrophy with periventricular ischemic white matter changes. The ventricles, sulci and cisterns are normal in appearance for patient's age. The gray-white matter differentiation is preserved throughout. There is no acute intracranial hemorrhage. No extra-axial fluid collection is identified. The bony calvarium is intact. The visualized portion of the paranasal sinuses and the mastoid air cells are clear. There is no focal extracranial soft tissue swelling. NO ACUTE INTRACRANIAL PROCESS     Ct Cervical Spine Wo Contrast    Result Date: 2019  Patient MRN: [id number] : 1924 Age:  80 years Gender: Male Order Date: 2019 2:30 PM Exam: CT CERVICAL SPINE WO CONTRAST Number of Images: 510 views Indication:   fall r/o fx  Comparison: None. Technique: Sequential axial CT was performed from the level of C1 to C7 without IV contrast. Multiplanar reformats were obtained. Radiation Output: CTDIvol 16.79 (mGy);  (mGy-cm) Findings: The study demonstrates there is normal vertebral body heights and alignment. There is osteopenia of the osseous structure. There is near fusion C3-C4, C5-C6 and C6-C7. There is associated spinal canal canal stenosis seen at C5-C6 and C6-C7. Paravertebral soft tissues are within normal limits. The occiput, C1 and C2 alignment is maintained. The craniovertebral junction is maintained. NO ACUTE FRACTURE OR SIGNIFICANT SUBLUXATION IS IDENTIFIED Severe osteopenia Severe osteoarthritic changes and disc disease with near fusion at C3-C4, C5-C6 and C6-C7. Zaki Bowdenerd      Nm Lung Vent/perfusion (vq)    Result Date: 3/21/2019  Patient MRN: [id number] : 1924 Age:  80 years Gender: Male Order Date: 3/21/2019 10:57 AM Exam: NM LUNG VENT/PERFUSION (VQ) Number of Images: 3 views Indication:  R06.02 SOBOE (shortness of breath on exertion) Comparison: None. Findings: The patient received 17 millicuries of PHEMX-885. Ventilation images reveal Normal ventilation throughout the lung fields The patient received 8 millicuries of technetium MAA. Perfusion images reveal no segmental perfusion defects. Low probability for pulmonary embolism    Xr Chest Portable    Result Date: 2019  Patient MRN: [id number] : 1924 Age:  80 years Gender: Male Order Date: 2019 2:30 PM Exam: XR CHEST PORTABLE Number of Views: 1 Indication:   Fall. Hypotension. Comparison: None Findings: There is a enlarged cardiomediastinal silhouette with mild central pulmonary vascular congestion and thoracic aortic vascular calcifications, right hemidiaphragmatic elevation nonspecific bibasilar airspace opacifications. No definitive displaced bony fracture on this limited fracture evaluation. Dana Chars No pneumothorax. 1. Enlarged cardiomediastinal silhouette, the possibility of underlying pericardial effusion or other cardiac abnormalities are not excluded on the basis of this exam, clinical correlation recommended. . 2. Mild central pulmonary vascular congestion with thoracic aortic vascular calcifications. 3. Nonspecific bibasilar airspace disease/opacifications, findings can be seen infiltrate/pneumonia and/or atelectasis. Fl Barium Enema    Result Date: 4/3/2019  Patient MRN: [id number] : 1924 Age:  80 years Gender: Male Order Date: 2019 12:00 AM Exam: FL BARIUM ENEMA Number of Images: 26 views Indication:   evaluate for colon mass, unable to complete colonoscopy evaluate for colon mass, unable to complete colonoscopy Comparison: None. FLUORO TIME : 2.2 minutes DOSE AREA PRODUCT: 1988.6 (uGym2) Findings: Numerous diverticula were seen throughout the sigmoid, descending, and left sided transverse colon.  The preprocedural fluoroscopic  image demonstrates no contrast throughout the colon. An enema air tip was inserted in the rectum and the retention cuff was insufflated. Under fluoroscopic visualization, contrast was slowly instilled into the colon. There was adequate distention of the rectosigmoid and distal descending colon with no evidence of obstruction or stricture in the area of concern. No ulcerative or polypoid lesion was identified. No evidence of obstruction or stricture in the large bowel. Diverticulosis without evidence of diverticulitis seen This procedure was performed by Vee Rock PA-C under the indirect supervision of Geoff Mayfield MD  who was not present for the procedure. . The exam has been dictated and signed by Vee Rock PA-C. Sherif Branham MD, Radiologist, have reviewed the images and report and concur with these findings. ------------------------- NURSING NOTES AND VITALS REVIEWED ---------------------------  Date / Time Roomed:  4/18/2019  2:08 PM  ED Bed Assignment:  [id number]    The nursing notes within the ED encounter and vital signs as below have been reviewed. Patient Vitals for the past 24 hrs:   BP Temp Temp src Pulse Resp   04/19/19 0810 (!) 118/54 98.8 °F (37.1 °C) Temporal 96 16   04/19/19 0024 (!) 116/56 98.2 °F (36.8 °C) Oral 65 18       Oxygen Saturation Interpretation: Normal    ------------------------------------------ PROGRESS NOTES ------------------------------------------  ED Course as of Apr 19 2231   Thu Apr 18, 2019   1825 Patient is refusing x-ray of the elbow, I discussed options and reasons why we would like to get imaging, he is still refusing.    [KS]      ED Course User Index  [KS] Violetta Rivera, DO             Counseling:  I have spoken with the patient and discussed todays results, in addition to providing specific details for the plan of care and counseling regarding the diagnosis and prognosis.   Their questions are answered at this time and they are agreeable with the plan of admission.    --------------------------------- ADDITIONAL PROVIDER NOTES ---------------------------------  Consultations:  Spoke with Dr. Nelly Whitten. Discussed case. They will admit the patient. This patient's ED course included: a personal history and physicial examination    This patient has remained hemodynamically stable during their ED course. Diagnosis:  Unable to Ambulate  Fall from Standing  Weakness    Disposition:  Patient's disposition: Admit to telemetry  Patient's condition is stable.          Haley Espino DO  Resident  04/19/19 2005

## 2019-04-18 NOTE — ED NOTES
Bed: 13  Expected date:   Expected time:   Means of arrival:   Comments:  EMS     Abdiel Lezama RN  04/18/19 [id number]

## 2019-04-18 NOTE — CARE COORDINATION
Social Work [address] [place] [address] Planning:    SW met with pt and daughter Osmel Brown) in pt's room and discussed [facility] placement upon discharge. Pt's daughter in agreement and reports pt has had several recent falls and is unsafe to return home alone. SW discussed [facility] options near pt's home. Pt's daughter reports they would like [facility]. SW made initial referral to Marcelina Castro with [employer]. Pt will need PT/OT evals. Assigned SW to follow.

## 2019-04-19 NOTE — PROGRESS NOTES
Emerson Lezama. Tocco was ordered fish oil. As per the [facility], nonformulary herbals and certain dietary supplements will be discontinued.  The herbal or dietary supplement may be continued after discharge from the hospital.  Italia Kumar [facility],4/18/2019 8:26 PM

## 2019-04-19 NOTE — PROGRESS NOTES
Occupational Therapy  OCCUPATIONAL THERAPY INITIAL EVALUATION      Date:2019  Patient Name: Kevin Murrell  MRN: [id number]  : 1924  Room: [address] [place] [address]    Evaluating OT:  SANTA Grady, OTR/L  # [id number]      AM-PAC Daily Activity Raw Score:  15/24  Recommended Adaptive Equipment:  TBD     Reason for Admission:  Pt was admitted after falling at home    Diagnosis:  Inability to ambulate       Procedures this admission:  None     Pertinent Medical History:  DM, HTN, EGD 19     Precautions:  Falls  Carb Control Diet    Home Living: Pt lives alone in a single-level apartment with 2 ALBINA and Polo HR/s. Bed/bath on the main level   Bathroom setup:  Tub-Shower, standard commode   Equipment owned:  [employer]    Available Family Assist:  Dtr lives nearby and can provide assist PRN    Prior Level of Function:  IND with ADLs, IADLs, Transfers and Mobility using FWW for ambulation. Prepares light meals, drives to his daughter's home for meals 2-3x/week  Driving:  Yes - Shopping   Occupation:  Retired Manager    Pain Level:  Denies; Additional Complaints:  Nausea and Vomiting - Emesis 2x during session - Nsg aware    Vitals/Lab Values:  WNL, Room Air    Cognition: A & O x 4   Able to Follow Multi-Step Commands INDly   Memory:  good    Sequencing:  good    Problem solving:  good (-) - occasional Min A for unfamiliar tasks   Judgement/safety:  good (-) - slightly impulsive  Additional Comments:  Pt was very pleasant and cooperative despite N/V.        Functional Assessment:   Initial Eval Status  Date: 19 Treatment Status  Date: Short Term Goals  Treatment frequency: PRN 2-4 x/week   Feeding SUP/Set up    Required occasional assist to open containers on tray - able to feed self w/ utensils, drink from cup     Grooming SUP/Set up    Seated EOB  Able to wash hands/face/comb hair after set up, unable to tolerate ambulating to bathroom at this time d/t NV  SUP  Standing At The Sink   UB Dressing Min A/Set provided. Will Review PRN. Provided Skilled SUP/Assist w/ Pt safety, Proper Positioning, ADLs, Transfers and Functional Mobility as noted above, as well as set up and clean up for session. Skilled monitoring of Vitals and pts response to treatment. [] Malnutrition indicators have been identified and nursing has been notified to ensure a dietitian consult is ordered. Comments/Treatment:  Upon arrival, pt was found semi-supine in bed. he was agreeable to participate in assessment ax. No family members present during assessment. Received permission from RN prior to engaging pt in assessment ax. At the end of the session, patient was properly positioned in semi-supine with call light and phone within reach, all lines and tubes intact. Oriented pt to call bell. Bed Alarm activated. Made all appropriate Environmental Modifications to facilitate pt's level of IND and safety. All needs met.          Pt would benefit from continued skilled OT services to increase safety and independence with completion of ADL/IADL tasks for functional independence and quality of life    Eval Complexity: Moderate     Assessment of current deficits   Functional mobility [x]  ADLs [] Strength []  Cognition []  Functional transfers  [] IADLs [] Safety Awareness []  Endurance []  Fine Motor Coordination [] Balance [] Vision/perception [] Sensation []   Gross Motor Coordination [] ROM [] Delirium []                  Motor Control []    Plan of Care:   ADL retraining [x]   Equipment needs [x]   Neuromuscular re-education [x] Energy Conservation Techniques [x]  Functional Transfer training [x] Patient and/or Family Education [x]  Functional Mobility training [x]  Environmental Modifications [x]  Cognitive re-training [x]   Compensatory techniques for ADLs [x]  Splinting Needs []   Positioning to improve overall function [x]   Therapeutic Activity [x]   Therapeutic Exercise  [x]  Visual/Perceptual: [x]    Delirium prevention/treatment  []  Other:  [x]    Rehab Potential:  Good for established goals    Patient / Family Goal:  Participate in a therapy program at d/c     Patient and/or Family were instructed on Functional Diagnosis, Prognosis/Goals and OT Plan of Care. Demonstrated Good understanding. Evaluation Time includes thorough review of current medical information, gathering information on past medical history/social history and prior level of function, completion of standardized testing/informal observation of tasks, assessment of data and education on plan of care and goals.         Mod Evaluation + 25 timed treatment minutes  Tx Time in:  0948  Tx Time out:  [place], [place], OTR/L  # [id number]

## 2019-04-19 NOTE — H&P
96   Temp 98.8 °F (37.1 °C) (Temporal)   Resp 16   Ht 5' 7\" (1.702 m)   Wt 158 lb (71.7 kg)   SpO2 98%   BMI 24.75 kg/m²     PHYSICAL EXAM:  General:  Awake, alert, oriented X 3. Well developed, well nourished, well groomed. No apparent distress. HEENT:  Normocephalic, atraumatic. Pupils equal, round, reactive to light. No scleral icterus. No conjunctival injection. Neck:  Supple, no carotid bruits  Heart:  RRR,   Lungs:  CTA bilaterally, bilat symmetrical expansion, no wheeze, rales, or rhonchi  Abdomen:   Bowel sounds present, soft, nontender, no masses, no organomegaly, no peritoneal signs  Extremities:  No clubbing, cyanosis, or edema  Skin:  Warm and dry, no open lesions or rash  Neuro:  Cranial nerves 2-12 intact, no focal deficits      DATA:     Recent Results (from the past 24 hour(s))   EKG 12 Lead    Collection Time: 04/18/19  2:37 PM   Result Value Ref Range    Ventricular Rate 75 BPM    Atrial Rate 85 BPM    P-R Interval 188 ms    QRS Duration 144 ms    Q-T Interval 482 ms    QTc Calculation (Bazett) 538 ms    R Axis -33 degrees    T Axis 74 degrees   CK    Collection Time: 04/18/19  4:11 PM   Result Value Ref Range    Total  (H) 20 - 200 U/L   CBC Auto Differential    Collection Time: 04/18/19  4:11 PM   Result Value Ref Range    WBC 9.1 4.5 - 11.5 E9/L    RBC 2.70 (L) 3.80 - 5.80 E12/L    Hemoglobin 8.5 (L) 12.5 - 16.5 g/dL    Hematocrit 26.0 (L) 37.0 - 54.0 %    MCV 96.3 80.0 - 99.9 fL    MCH 31.5 26.0 - 35.0 pg    MCHC 32.7 32.0 - 34.5 %    RDW 14.6 11.5 - 15.0 fL    Platelets 284 990 - 187 E9/L    MPV 9.7 7.0 - 12.0 fL    Neutrophils % 68.3 43.0 - 80.0 %    Immature Granulocytes % 0.5 0.0 - 5.0 %    Lymphocytes % 19.0 (L) 20.0 - 42.0 %    Monocytes % 10.2 2.0 - 12.0 %    Eosinophils % 1.8 0.0 - 6.0 %    Basophils % 0.2 0.0 - 2.0 %    Neutrophils # 6.24 1.80 - 7.30 E9/L    Immature Granulocytes # 0.05 E9/L    Lymphocytes # 1.74 1.50 - 4.00 E9/L    Monocytes # 0.93 0.10 - 0.95 E9/L 04/18/19 11:57 PM   Result Value Ref Range    Total  (H) 20 - 200 U/L   Basic metabolic panel    Collection Time: 04/18/19 11:57 PM   Result Value Ref Range    Sodium 141 132 - 146 mmol/L    Potassium 3.8 3.5 - 5.0 mmol/L    Chloride 108 (H) 98 - 107 mmol/L    CO2 25 22 - 29 mmol/L    Anion Gap 8 7 - 16 mmol/L    Glucose 82 74 - 99 mg/dL    BUN 23 8 - 23 mg/dL    CREATININE 2.4 (H) 0.7 - 1.2 mg/dL    GFR Non-African American 25 >=60 mL/min/1.73    GFR African American 31     Calcium 7.9 (L) 8.6 - 10.2 mg/dL       CT Head WO Contrast   Final Result      NO ACUTE INTRACRANIAL PROCESS         CT Cervical Spine WO Contrast   Final Result      NO ACUTE FRACTURE OR SIGNIFICANT SUBLUXATION IS IDENTIFIED      Severe osteopenia      Severe osteoarthritic changes and disc disease with near fusion at   C3-C4, C5-C6 and C6-C7. Silver Forester XR CHEST PORTABLE   Final Result   1. Enlarged cardiomediastinal silhouette, the possibility of   underlying pericardial effusion or other cardiac abnormalities are not   excluded on the basis of this exam, clinical correlation recommended. .   2. Mild central pulmonary vascular congestion with thoracic aortic   vascular calcifications. 3. Nonspecific bibasilar airspace disease/opacifications, findings can   be seen infiltrate/pneumonia and/or atelectasis. XR ELBOW LEFT (MIN 3 VIEWS)    (Results Pending)           ASSESSMENT :      Active Problems:    Unable to ambulate  Resolved Problems:    * No resolved hospital problems. 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  Every effort was made to ensure accuracy; however, inadvertent transcription errors may be present

## 2019-04-19 NOTE — CARE COORDINATION
HENS completed. HENS and Ambulette form on soft chart. Discharge plan is for Pt to go to [facility].

## 2019-04-19 NOTE — DISCHARGE INSTR - COC
(chronic kidney disease) stage 4, GFR 15-29 ml/min ([facility]) N18.4    Diabetes mellitus type 2, uncontrolled ([facility]) E11.65    Hyperlipidemia LDL goal <100 E78.5    Abdominal pain R10.9    Anorexia R63.0    Unable to ambulate R26.2       Isolation/Infection:   Isolation          No Isolation            Nurse Assessment:  Last Vital Signs: BP (!) 118/54   Pulse 96   Temp 98.8 °F (37.1 °C) (Temporal)   Resp 16   Ht 5' 7\" (1.702 m)   Wt 158 lb (71.7 kg)   SpO2 98%   BMI 24.75 kg/m²     Last documented pain score (0-10 scale): Pain Level: 0  Last Weight:   Wt Readings from Last 1 Encounters:   04/18/19 158 lb (71.7 kg)     Mental Status:  oriented and alert    IV Access:  - None    Nursing Mobility/ADLs:  Walking   Stand by assistance  Transfer  Independent  Bathing  Independent  Dressing  Independent  [address]  Med Delivery   whole    Wound Care Documentation and Therapy:  Wound 04/18/19 Elbow Right;Dorsal (Active)   Wound Skin Tear 4/19/2019  8:10 AM   Dressing Changed Changed/New 4/19/2019 12:25 AM   Wound Cleansed Rinsed/Irrigated with saline 4/18/2019  8:00 PM   Wound Length (cm) 4 cm 4/18/2019  8:00 PM   Wound Width (cm) 2 cm 4/18/2019  8:00 PM   Wound Surface Area (cm^2) 8 cm^2 4/18/2019  8:00 PM   Wound Assessment Fragile;Pink;Red 4/18/2019  8:00 PM   Drainage Amount Scant 4/18/2019  8:00 PM   Drainage Description Sanguinous 4/18/2019  8:00 PM   Odor None 4/18/2019  8:00 PM   Radha-wound Assessment Fragile;Pink 4/19/2019 12:25 AM   Number of days: 0        Elimination:  Continence:   · Bowel: Yes  · Bladder: Yes  Urinary Catheter: None   Colostomy/Ileostomy/Ileal Conduit: No       Date of Last BM: ***    Intake/Output Summary (Last 24 hours) at 4/19/2019 1422  Last data filed at 4/19/2019 0659  Gross per 24 hour   Intake 2542 ml   Output 250 ml   Net 2292 ml     I/O last 3 completed shifts:   In: 0280 [P.O.:60; I.V.:1482; IV Piggyback:1000]  Out: 250 [Urine:250]    Safety Concerns:     History of Falls (last 30 days)    Impairments/Disabilities:      None    Nutrition Therapy:  Current Nutrition Therapy:   - Oral Diet:  General and Carb Control 4 carbs/meal (1800kcals/day)    Routes of Feeding: Oral  Liquids: No Restrictions  Daily Fluid Restriction: no  Last Modified Barium Swallow with Video (Video Swallowing Test): not done    Treatments at the Time of Hospital Discharge:   Respiratory Treatments: See meds  Oxygen Therapy:  is not on home oxygen therapy. Ventilator:    - No ventilator support    Rehab Therapies: Physical Therapy and Occupational Therapy  Weight Bearing Status/Restrictions: No weight bearing restirctions  Other Medical Equipment (for information only, NOT a DME order):  walker  Other Treatments: ***    Patient's personal belongings (please select all that are sent with patient):  {[facility] DME Belongings:[id number]}    RN SIGNATURE:  Electronically signed by Polo Reardon RN on 4/19/19 at 3:30 PM    CASE MANAGEMENT/SOCIAL WORK SECTION    Inpatient Status Date: ***    Readmission Risk Assessment Score:  Readmission Risk              Risk of Unplanned Readmission:        14           Discharging to Facility/ Agency   · Name: [facility]  · Address:  · Phone:  · Fax:    Dialysis Facility (if applicable)   · Name:  · Address:  · Dialysis Schedule:  · Phone:  · Fax:    / signature: Electronically signed by KAREL Patrick on 4/19/19 at 2:23 PM    PHYSICIAN SECTION    Prognosis: Good    Condition at Discharge: Stable    Rehab Potential (if transferring to Rehab): Good    Recommended Labs or Other Treatments After Discharge: ***    Physician Certification: I certify the above information and transfer of Pati Murrell  is necessary for the continuing treatment of the diagnosis listed and that he requires Ryan Hans for less 30 days.      Update Admission H&P: No change in H&P    PHYSICIAN SIGNATURE: Electronically signed by Kina Carrasco MD on 4/19/19 at 2:34 PM

## 2019-04-19 NOTE — PROGRESS NOTES
Physical Therapy    Facility/Department: [address] PICU  Initial Assessment    NAME: Param Murrell  : 1924  MRN: [id number]    Date of Service: 2019  Evaluating Therapist: Marquez Triplett PT, DPT    Room #: 3197C  DIAGNOSIS: unable to ambulate   PRECAUTIONS: falls   Pertinent Medical History: DMII, HTN    Social:    Pt lives alone. Pt lives in a 1 story apt with 2 steps and 2 hand rail(s) to enter. Prior to admission pt used ww and required no assistance with self care or mobility. Pt is independent and drives. Has family in area that can help as needed. Pt reports history of falls recently      Initial Evaluation  Date: 19 Treatment  Date: NA  Short Term/ Long Term   Goals   Was pt agreeable to Eval/treatment? Yes      Did pt report pain? Pt denied pain     Bed Mobility  Rolling: min A   Supine to sit: min A   Sit to supine: mod A   Scooting: min A   Mod I   Transfers Sit to stand: min A   Stand to sit: min A   Stand pivot: min A   Mod I   Ambulation   50 feet with ww with min-mod A  >150 feet with AAD with mod I   Stair negotiation: ascended and descended NT  2 steps 2 HR    AMPA Raw Score 16/24       Alertness/Orientation: x4  LE AROM: Grossly WFL  LE Strength: Grossly 4/5   Static Balance: siting SBA, stnading SBA with ww   Dynamic Balance: min-mod A standing with ww   Endurance: fair-good  Sensation: denied numbness/tinglign   Edema/Skin Integrity: no visible skin integrity issues noted     Chair/Bed Alarm: armed      ASSESSMENT/TREATMENT  Pt displays functional ability as noted in the objective portion of this evaluation. Pt performed the following therapeutic activities/exercise:   Seated ankle pumps, LAQ, hip flexion, hip abduction/adduction, shoulder press, elbow flexion/extension all x20 reps B      Comments: Pt was supine upon PT arrival and agreeable to PT evaluation/treatment. Pt required increased time with all mobility, cues for asafety and posture.  Pt demonstrates unsafe use of ww and poor posture  With mobiltiy at times and responded minimally to cues to correct due to fatigue and wanting to hurry through ambulation. Pt tolerated activity well. Educated pt on safety concerns. Pt is unsafe to return home due to imbalance and fall risk as well as increased risk for safety concerns. Recommend contineud skilled PT services at discharge prior to return home. At end of session pt supine with call light within reach and all needs met. Patient education  Pt educated re: safety recommendations, PT treatment expectations and POC, PT d/c recommendations. Patient response to education:   Pt verbalized understanding Pt demonstrated skill Pt requires further education in this area   Yes  partial Yes      Rehab potential is good for reaching above PT goals. Pts/ family goals   To get better       PLAN  PT care will be provided in accordance with the objectives noted above. Whenever appropriate, clear delegation orders will be provided for nursing staff. Exercises and functional mobility practice will be used as well as appropriate assistive devices or modalities to obtain goals. Patient and family education will also be administered as needed. Frequency of treatments will be 2-5x/week x 1-3 days. Patient and or family understand(s) diagnosis, prognosis, and plan of care.       Time in: 1400  Time out: [address] [facility]   License number:  [id number]

## 2019-04-19 NOTE — CARE COORDINATION
Discharge order received. [facility] to  by ambulette 5pm. SW notified Dtr George Wallace and [facility]. that Pt is discharged and [facility] picking up at 5pm to go to [facility].

## 2019-04-19 NOTE — CARE COORDINATION
[facility] has accepted, will need therapy evals for facility to determine if they can take pending precert.  CM will follow

## 2019-04-22 NOTE — CARE COORDINATION
[facility] Update Call    2019    Patient: Mena Murrell Patient : 1924   MRN: <[id number]>  Reason for Admission: There are no discharge diagnosis documented for the most recent discharge. Discharge Date: 19 RARS: Readmission Risk Score: 14       Spoke with Oxana, , at [facility] and confirmed patient is currently at the facility. Discussed will be calling for weekly patient progress updates; RN CTC contact information provided.

## 2019-04-25 NOTE — CARE COORDINATION
[facility] Update Call    2019    Patient: Timothy Drain. [place] Patient : 1924   MRN: <[id number]>  Reason for Admission: There are no discharge diagnosis documented for the most recent discharge. Discharge Date: 19 RARS: Readmission Risk Score: 14       Spoke with Brendan Kelly, in [employer], [facility]. Cindy Conway provides the following patient update:   -Goal is to return to home .   -Patient is current with therapies PT/OT   -ambulating 76' two times with [employer] [facility]   -No anticipated date of discharge.

## 2019-04-28 NOTE — DISCHARGE SUMMARY
Patient stayed less than 24 hours  Patient had recurrent falls at home  He was also noted to have mild rhabdomyolysis  Patient was discharged to subacute rehabilitation

## 2019-04-28 NOTE — DISCHARGE SUMMARY
Physician Discharge Summary     Patient ID:  Pati Ascencio  [id number]  89 y.o.  1/23/1924    Admit date: 3/30/2019    Discharge date and time: 4/2/2019  7:58 PM     Admission Diagnoses: Active Problems:    Essential hypertension    Benign prostatic hyperplasia without lower urinary tract symptoms    CKD (chronic kidney disease) stage 4, GFR 15-29 ml/min (HCC)    Diabetes mellitus type 2, uncontrolled ([address]    Hyperlipidemia LDL goal <100    Abdominal pain    Anorexia  Resolved Problems:    * No resolved hospital problems. *      Discharge Diagnoses: Active Problems:    Essential hypertension    Benign prostatic hyperplasia without lower urinary tract symptoms    CKD (chronic kidney disease) stage 4, GFR 15-29 ml/min (HCC)    Diabetes mellitus type 2, uncontrolled ([address]    Hyperlipidemia LDL goal <100    Abdominal pain    Anorexia  Resolved Problems:    * No resolved hospital problems. *      Condition at discharge : stable but guarded    Consults: General surgery    Procedures: EGD    Hospital Course: patient is a 80year-old gentleman who presents with nausea, poor appetite, 10 pound weight loss. Patient was then admitted for further evaluation and treatment. General surgery was consulted. Metformin was discontinued because of elevated renal function. 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 Colonoscopy could not be completed because of poor prep. Barium enema was then ordered. This revealed diverticulosis only no evidence for diverticulitis. FL BARIUM ENEMA   Final Result   No evidence of obstruction or stricture in the large bowel. Diverticulosis without evidence of diverticulitis seen      This procedure was performed by Afua Velasco PA-C under the indirect   supervision of Jami Hahn MD  who was not present for the   procedure. .      The exam has been dictated and signed by Afua Velasco PA-C.     Lio Solares MD, Radiologist, have reviewed the images and   report and concur with these findings.           Results for orders placed or performed during the hospital encounter of 04/18/19 (from the past 336 hour(s))   EKG 12 Lead    Collection Time: 04/18/19  2:37 PM   Result Value Ref Range    Ventricular Rate 75 BPM    Atrial Rate 85 BPM    P-R Interval 188 ms    QRS Duration 144 ms    Q-T Interval 482 ms    QTc Calculation (Bazett) 538 ms    R Axis -33 degrees    T Axis 74 degrees   CK    Collection Time: 04/18/19  4:11 PM   Result Value Ref Range    Total  (H) 20 - 200 U/L   CBC Auto Differential    Collection Time: 04/18/19  4:11 PM   Result Value Ref Range    WBC 9.1 4.5 - 11.5 E9/L    RBC 2.70 (L) 3.80 - 5.80 E12/L    Hemoglobin 8.5 (L) 12.5 - 16.5 g/dL    Hematocrit 26.0 (L) 37.0 - 54.0 %    MCV 96.3 80.0 - 99.9 fL    MCH 31.5 26.0 - 35.0 pg    MCHC 32.7 32.0 - 34.5 %    RDW 14.6 11.5 - 15.0 fL    Platelets 662 321 - 404 E9/L    MPV 9.7 7.0 - 12.0 fL    Neutrophils % 68.3 43.0 - 80.0 %    Immature Granulocytes % 0.5 0.0 - 5.0 %    Lymphocytes % 19.0 (L) 20.0 - 42.0 %    Monocytes % 10.2 2.0 - 12.0 %    Eosinophils % 1.8 0.0 - 6.0 %    Basophils % 0.2 0.0 - 2.0 %    Neutrophils # 6.24 1.80 - 7.30 E9/L    Immature Granulocytes # 0.05 E9/L    Lymphocytes # 1.74 1.50 - 4.00 E9/L    Monocytes # 0.93 0.10 - 0.95 E9/L    Eosinophils # 0.16 0.05 - 0.50 E9/L    Basophils # 0.02 0.00 - 0.20 E9/L   Comprehensive Metabolic Panel w/ Reflex to MG    Collection Time: 04/18/19  4:11 PM   Result Value Ref Range    Sodium 142 132 - 146 mmol/L    Potassium reflex Magnesium 3.7 3.5 - 5.0 mmol/L    Chloride 105 98 - 107 mmol/L    CO2 24 22 - 29 mmol/L    Anion Gap 13 7 - 16 mmol/L    Glucose 74 74 - 99 mg/dL    BUN 25 (H) 8 - 23 mg/dL    CREATININE 2.7 (H) 0.7 - 1.2 mg/dL    GFR Non-African American 22 >=60 mL/min/1.73    GFR African American 27     Calcium 8.7 8.6 - 10.2 mg/dL    Total Protein 6.3 (L) 6.4 - 8.3 g/dL    Alb 3.0 (L) 3.5 - 5.2 g/dL    Total Bilirubin 0.3 0.0 - 1.2 mg/dL Alkaline Phosphatase 59 40 - 129 U/L    ALT 11 0 - 40 U/L    AST 31 0 - 39 U/L   Troponin    Collection Time: 04/18/19  4:11 PM   Result Value Ref Range    Troponin 0.03 0.00 - 0.03 ng/mL   Urine Culture    Collection Time: 04/18/19  6:15 PM   Result Value Ref Range    Urine Culture, Routine Growth not present    Urinalysis, reflex to microscopic    Collection Time: 04/18/19  6:15 PM   Result Value Ref Range    Color, UA Yellow Straw/Yellow    Clarity, UA Clear Clear    Glucose, Ur Negative Negative mg/dL    Bilirubin Urine Negative Negative    Ketones, Urine 15 (A) Negative mg/dL    Specific Gravity, UA >=1.030 1.005 - 1.030    Blood, Urine MODERATE (A) Negative    pH, UA 5.5 5.0 - 9.0    Protein, UA Negative Negative mg/dL    Urobilinogen, Urine 0.2 <2.0 E.U./dL    Nitrite, Urine Negative Negative    Leukocyte Esterase, Urine Negative Negative   Microscopic Urinalysis    Collection Time: 04/18/19  6:15 PM   Result Value Ref Range    Casts RARE /LPF    WBC, UA 2-5 0 - 5 /HPF    RBC, UA 5-10 (A) 0 - 2 /HPF    Epi Cells FEW /HPF    Renal Epithelial, Urine RARE /HPF    Bacteria, UA FEW (A) /HPF   POCT Glucose    Collection Time: 04/18/19  8:42 PM   Result Value Ref Range    Meter Glucose 72 (L) 74 - 99 mg/dL   CK    Collection Time: 04/18/19 11:57 PM   Result Value Ref Range    Total  (H) 20 - 200 U/L   Basic metabolic panel    Collection Time: 04/18/19 11:57 PM   Result Value Ref Range    Sodium 141 132 - 146 mmol/L    Potassium 3.8 3.5 - 5.0 mmol/L    Chloride 108 (H) 98 - 107 mmol/L    CO2 25 22 - 29 mmol/L    Anion Gap 8 7 - 16 mmol/L    Glucose 82 74 - 99 mg/dL    BUN 23 8 - 23 mg/dL    CREATININE 2.4 (H) 0.7 - 1.2 mg/dL    GFR Non-African American 25 >=60 mL/min/1.73    GFR African American 31     Calcium 7.9 (L) 8.6 - 10.2 mg/dL   Results for orders placed or performed during the hospital encounter of 04/15/19 (from the past 336 hour(s))   EKG 12 Lead    Collection Time: 04/15/19  9:01 AM   Result Value Ref Range    Ventricular Rate 71 BPM    Atrial Rate 71 BPM    P-R Interval 316 ms    QRS Duration 142 ms    Q-T Interval 450 ms    QTc Calculation (Bazett) 489 ms    P Axis 49 degrees    R Axis -31 degrees    T Axis 31 degrees   POCT Glucose    Collection Time: 04/15/19  9:08 AM   Result Value Ref Range    Meter Glucose 107 (H) 74 - 99 mg/dL   Comprehensive Metabolic Panel    Collection Time: 04/15/19  9:24 AM   Result Value Ref Range    Sodium 141 132 - 146 mmol/L    Potassium 4.1 3.5 - 5.0 mmol/L    Chloride 104 98 - 107 mmol/L    CO2 24 22 - 29 mmol/L    Anion Gap 13 7 - 16 mmol/L    Glucose 111 (H) 74 - 99 mg/dL    BUN 26 (H) 8 - 23 mg/dL    CREATININE 2.5 (H) 0.7 - 1.2 mg/dL    GFR Non-African American 24 >=60 mL/min/1.73    GFR African American 29     Calcium 8.7 8.6 - 10.2 mg/dL    Total Protein 6.6 6.4 - 8.3 g/dL    Alb 3.2 (L) 3.5 - 5.2 g/dL    Total Bilirubin <0.2 0.0 - 1.2 mg/dL    Alkaline Phosphatase 63 40 - 129 U/L    ALT 7 0 - 40 U/L    AST 18 0 - 39 U/L   CBC Auto Differential    Collection Time: 04/15/19  9:24 AM   Result Value Ref Range    WBC 8.7 4.5 - 11.5 E9/L    RBC 2.66 (L) 3.80 - 5.80 E12/L    Hemoglobin 8.3 (L) 12.5 - 16.5 g/dL    Hematocrit 26.0 (L) 37.0 - 54.0 %    MCV 97.7 80.0 - 99.9 fL    MCH 31.2 26.0 - 35.0 pg    MCHC 31.9 (L) 32.0 - 34.5 %    RDW 14.4 11.5 - 15.0 fL    Platelets 868 606 - 565 E9/L    MPV 10.0 7.0 - 12.0 fL    Neutrophils % 61.9 43.0 - 80.0 %    Immature Granulocytes % 0.7 0.0 - 5.0 %    Lymphocytes % 24.5 20.0 - 42.0 %    Monocytes % 10.5 2.0 - 12.0 %    Eosinophils % 2.2 0.0 - 6.0 %    Basophils % 0.2 0.0 - 2.0 %    Neutrophils # 5.38 1.80 - 7.30 E9/L    Immature Granulocytes # 0.06 E9/L    Lymphocytes # 2.13 1.50 - 4.00 E9/L    Monocytes # 0.91 0.10 - 0.95 E9/L    Eosinophils # 0.19 0.05 - 0.50 E9/L    Basophils # 0.02 0.00 - 0.20 E9/L   Lipase    Collection Time: 04/15/19  9:24 AM   Result Value Ref Range    Lipase 77 (H) 13 - 60 U/L   Lactate, Sepsis Collection Time: 04/15/19  9:24 AM   Result Value Ref Range    Lactic Acid, Sepsis 1.3 0.5 - 1.9 mmol/L   POCT Venous    Collection Time: 04/15/19 10:49 AM   Result Value Ref Range    POC Sodium 142 132 - 146 mmol/L    POC Potassium 4.1 3.5 - 5.0 mmol/L    POC Chloride 111 (H) 100 - 108 mmol/L    POC Glucose 102 (H) 74 - 99 mg/dl    POC Creatinine 2.2 (H) 0.7 - 1.2 mg/dL    GFR Non-African American 28 >=60 mL/min/1.73    GFR  34     Performed on SEE BELOW    Urinalysis    Collection Time: 04/15/19 11:35 AM   Result Value Ref Range    Color, UA Yellow Straw/Yellow    Clarity, UA Clear Clear    Glucose, Ur Negative Negative mg/dL    Bilirubin Urine SMALL (A) Negative    Ketones, Urine 40 (A) Negative mg/dL    Specific Gravity, UA >=1.030 1.005 - 1.030    Blood, Urine TRACE-INTACT Negative    pH, UA 5.5 5.0 - 9.0    Protein, UA TRACE Negative mg/dL    Urobilinogen, Urine 0.2 <2.0 E.U./dL    Nitrite, Urine Negative Negative    Leukocyte Esterase, Urine SMALL (A) Negative   Microscopic Urinalysis    Collection Time: 04/15/19 11:35 AM   Result Value Ref Range    Casts RARE /LPF    WBC, UA 10-20 (A) 0 - 5 /HPF    RBC, UA 2-5 0 - 2 /HPF    Bacteria, UA FEW (A) /HPF         Discharge Exam:  See progress note from today    Disposition: home    Patient Instructions:   Discharge Medication List as of 4/2/2019  6:01 PM      CONTINUE these medications which have NOT CHANGED    Details   JANUVIA 50 MG tablet Take 1 tablet by mouth daily, Disp-30 tablet, R-3, DAWNO PRINT      Omega-3 Fatty Acids (OMEGA-3 FISH OIL) 1000 MG CAPS Take by mouth dailyHistorical Med      pantoprazole (PROTONIX) 40 MG tablet Take 1 tablet by mouth every morning (before breakfast), Disp-90 tablet, R-1Normal      tamsulosin (FLOMAX) 0.4 MG capsule Take 1 capsule by mouth daily, Disp-90 capsule, R-3Normal      losartan-hydrochlorothiazide (HYZAAR) 50-12.5 MG per tablet Take 1 tablet by mouth daily, Disp-90 tablet, R-3Normal      blood glucose test strips (FREESTYLE TEST STRIPS) strip 2 TIMES DAILY Starting Wed 7/25/2018, Disp-100 each, R-3, NormalAs needed.        Mirabegron ER (MYRBETRIQ) 50 MG TB24 Take 50 mg by mouth daily, Disp-90 tablet, R-3Normal      simvastatin (ZOCOR) 10 MG tablet Take 1 tablet by mouth nightly, Disp-90 tablet, R-3Normal      cyanocobalamin 1000 MCG/ML injection Inject 1,000 mcg into the muscle once Once [id number] Med      ferrous sulfate 325 (65 Fe) MG tablet Take 325 mg by mouth daily (with breakfast)Historical Med      Pediatric Multivitamins-Fl (MULTI VIT/FL) 0.25 MG CHEW Take by mouthHistorical Med         STOP taking these medications       metFORMIN (GLUCOPHAGE) 850 MG tablet Comments:   Reason for Stopping:               Activity: as tolerated    Diet: diabetic    Follow-up with Cesar Conklin MD  [address] [address]    Schedule an appointment as soon as possible for a visit  For follow-up    Jagruti Montoya MD  [address] [phone]    Schedule an appointment as soon as possible for a visit in 2 weeks  For routine follow-up and discussion of biopsy results         Note that over 30 minutes was spent in preparing discharge papers, discussing discharge with patient, medication review, etc.    Signed:  Cesar Conklin  4/28/2019  4:11 PM

## 2019-05-02 NOTE — CARE COORDINATION
[facility] Update Call    2019    Patient: Mena Avelarco Patient : 1924   MRN: <[id number]>  Reason for Admission: There are no discharge diagnosis documented for the most recent discharge. Discharge Date: 19 RARS: Readmission Risk Score: 14       Spoke with Dominique Prince, in [employer], at [facility]. Ryan Sylvester provides the following patient update:   -Care Plan Meeting on 19; patient DNR-CC   -Plan is possible home with Hospice or patient will stay at the facility [address] with Hospice.    -Family requests [facility].

## 2019-05-09 NOTE — CARE COORDINATION
[facility] Update Call    2019    Patient: Shyla Murrell Patient : 1924   MRN: <[id number]>  Reason for Admission: There are no discharge diagnosis documented for the most recent discharge. Discharge Date: 19 RARS: Readmission Risk Score: 14       Left voice message for Katina Dang  at [facility] requesting a call back with patient progress update. RN CTC contact information provided.

## 2019-05-16 NOTE — CARE COORDINATION
[facility] Update Call    2019    Patient: Liv [place] Patient : 1924   MRN: <[id number]>  Reason for Admission: There are no discharge diagnosis documented for the most recent discharge. Discharge Date: 19 RARS: Readmission Risk Score: 14       Left voice message for Lucio Llanes in  at [facility] requesting a call back with patient progress update. RN CTC contact information provided.

## 2019-05-23 NOTE — CARE COORDINATION
[facility] Update Call    2019    Patient: Colten Cabrera. [place] Patient : 1924   MRN: <[id number]>  Reason for Admission: There are no discharge diagnosis documented for the most recent discharge. Discharge Date: 19 RARS: Readmission Risk Score: 14       Spoke with [place] in [address] [place] [address] at [facility]. Tentative date of discharge is 19. Patient to be discharged to [address] [place] [address]

## 2019-05-29 NOTE — TELEPHONE ENCOUNTER
CLINICAL PHARMACY NOTE  Post-Discharge Transitions of Care [facility])    Patient appears to have been discharged from [facility] on 05/11/12019. Called facility to obtain discharge medication list.  Waiting for fax.        30 days since discharge = 06/10/2019     [address]  [phone] or [phone] (Option 7)

## 2019-06-03 NOTE — TELEPHONE ENCOUNTER
Received Medication List from [facility] via fax. Scanned into patients EMR under the Media Tab.

## 2019-06-03 NOTE — CARE COORDINATION
Spoke with GENOVEVA Beckwith at [address] [place]. Amena states she contacted PCP's office and spoke with Thejohanna Hodges; awaiting to hear from patient's daughter to schedule an earlier follow up appointment for the patient.

## 2019-06-03 NOTE — CARE COORDINATION
Spoke with Oxana, , at [facility]. Oxana reports that the patient ws discharged [facility] on 5/31/19.

## 2019-06-04 NOTE — TELEPHONE ENCOUNTER
Sesar Louis MD, from below,  - Is patient to continue ondansetron 4mg TID (scheduled) and ondansetron 8mg prn? Or ok for prn only (and if so, 4mg prn or 8mg prn?)    Thank you,  Brayan Sandoval, PharmD, [address]  Direct: [phone]  Department, toll free: [phone], option 7     =======================================================   CLINICAL PHARMACY NOTE  Post-Discharge Transitions of Care (ALLA)    Subjective/Objective:  Outreach to review discharge medications and provide medication review and management. Review of media scanned document of [facility] Order Summary report 5/29/19 and [facility] MAR from admission date 5/31/19, with current [facility]) medication list.     Current Outpatient Medications   Medication Sig Dispense Refill    tamsulosin (FLOMAX) 0.4 MG capsule TAKE 1 CAPSULE BY MOUTH DAILY 1 capsule 2    TRADJENTA 5 MG tablet TAKE ONE(1) TABLET BY MOUTH ONCE DAILY 1 tablet 2    ondansetron (ZOFRAN) 4 MG tablet TAKE 1 TABLET BY MOUTH 3 TIMES DAILY.  TAKE 1/2 HOUR BEFORE MEALS. 1 tablet 2    ondansetron (ZOFRAN) 8 MG table TAKE ONE TABLET BY MOUTH EVERY 8 HOURS AS NEEDED N/V 1 tablet 2    cyanocobalamin 1000 MCG/ML injection    *noted RN CTC communication/encounter re [facility] needing order - appears this was sent by PCP yesterday INJECT 1ML IM THE FIRST OF THE MONTH 1 mL 2    amLODIPine (NORVASC) 2.5 MG tablet TAKE ONE(1) TABLET BY MOUTH ONCE DAILY 1 tablet 2    pantoprazole (PROTONIX) 40 MG tablet TAKE ONE(1) TABLET BY MOUTH ONCE DAILY **DO NOT CRUSH** 1 tablet 2    ondansetron (ZOFRAN ODT) 8 MG TBDP disintegrating tablet    *not ODT (8mg tab as above) - removed Take 1 tablet by mouth every 8 hours as needed for Nausea 12 tablet 1    Omega-3 Fatty Acids (OMEGA-3 FISH OIL) 1000 MG CAPS    *not on SNF or [facility] lists - removed for now Take 1 capsule by mouth daily       ferrous sulfate 325 (65 Fe) MG tablet    *not on SNF or

## 2019-06-05 NOTE — TELEPHONE ENCOUNTER
Spoke to KRISTINAΙ at [address], advised:  · Discontinue ondansetron 8mg prn   · Discontinue ondansetron 4mg TID scheduled  · New/updated instructions for ondansetron 4mg q8h prn  · Any changes/concerns with N/V, please contact PCP office    [facility] also requests updated rx to their pharmacy: [facility]. Cannot reorder/pend in this encounter because rx on med list was ordered after this encounter -- see refill encounter to PCP.

## 2019-06-05 NOTE — TELEPHONE ENCOUNTER
Noted ondansetron rx signed in other/refill encounter, thank you!    =======================================================   For Pharmacy Admin Tracking Only    TCM Call Made?: No  [facility] ([facility] Select Patient?: Yes  Total # of Interventions Recommended: 2 -   - Decreased Dose #: 1  - Updated Order #: 1  Total # Interventions Accepted: 2  Intervention Severity:   - Level 1 Intervention Present?: No   - Level 2 #: 0   - Level 3 #: 1  Outreach Status: Review Complete  Care Coordinator Outreach to Patient?: No  Provider Contacted?: Yes - Note Routed, Routine  Time Spent (min): 30

## (undated) DEVICE — BLOCK BITE 60FR RUBBER ADLT DENTAL

## (undated) DEVICE — CONTAINER SPEC 60ML PH 7NEUTRAL BUFF FRMLN RDY TO USE

## (undated) DEVICE — CONNECTOR TBNG AUX H2O JET DISP FOR OLY 160/180 SER

## (undated) DEVICE — FORCEPS BX L160CM JAW DIA2.4MM YEL L CAP W/ NDL DISP RAD

## (undated) DEVICE — GAUZE,SPONGE,POST-OP,4X3,STRL,LF: Brand: MEDLINE

## (undated) DEVICE — DEFENDO AIR WATER SUCTION AND BIOPSY VALVE KIT FOR  OLYMPUS: Brand: DEFENDO AIR/WATER/SUCTION AND BIOPSY VALVE

## (undated) DEVICE — CANNULA NSL ORAL AD FOR CAPNOFLEX CO2 O2 AIRLFE